# Patient Record
Sex: FEMALE | Race: OTHER | HISPANIC OR LATINO | URBAN - METROPOLITAN AREA
[De-identification: names, ages, dates, MRNs, and addresses within clinical notes are randomized per-mention and may not be internally consistent; named-entity substitution may affect disease eponyms.]

---
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
## 2023-01-26 ENCOUNTER — EMERGENCY (EMERGENCY)
Facility: HOSPITAL | Age: 23
LOS: 1 days | Discharge: HOME | End: 2023-01-26
Attending: EMERGENCY MEDICINE | Admitting: EMERGENCY MEDICINE
Payer: COMMERCIAL

## 2023-01-26 VITALS
DIASTOLIC BLOOD PRESSURE: 60 MMHG | HEART RATE: 104 BPM | SYSTOLIC BLOOD PRESSURE: 126 MMHG | TEMPERATURE: 99 F | RESPIRATION RATE: 16 BRPM | OXYGEN SATURATION: 100 %

## 2023-01-26 VITALS
OXYGEN SATURATION: 100 % | DIASTOLIC BLOOD PRESSURE: 68 MMHG | TEMPERATURE: 99 F | RESPIRATION RATE: 18 BRPM | SYSTOLIC BLOOD PRESSURE: 128 MMHG

## 2023-01-26 PROBLEM — D64.9 ANEMIA, UNSPECIFIED: Chronic | Status: ACTIVE | Noted: 2017-12-11

## 2023-01-26 LAB
ALBUMIN SERPL ELPH-MCNC: 4.6 G/DL — SIGNIFICANT CHANGE UP (ref 3.3–5)
ALP SERPL-CCNC: 55 U/L — SIGNIFICANT CHANGE UP (ref 40–120)
ALT FLD-CCNC: 10 U/L — SIGNIFICANT CHANGE UP (ref 4–33)
ANION GAP SERPL CALC-SCNC: 12 MMOL/L — SIGNIFICANT CHANGE UP (ref 7–14)
APPEARANCE UR: ABNORMAL
AST SERPL-CCNC: 26 U/L — SIGNIFICANT CHANGE UP (ref 4–32)
BACTERIA # UR AUTO: ABNORMAL
BASOPHILS # BLD AUTO: 0.04 K/UL — SIGNIFICANT CHANGE UP (ref 0–0.2)
BASOPHILS NFR BLD AUTO: 0.4 % — SIGNIFICANT CHANGE UP (ref 0–2)
BILIRUB SERPL-MCNC: 0.3 MG/DL — SIGNIFICANT CHANGE UP (ref 0.2–1.2)
BILIRUB UR-MCNC: NEGATIVE — SIGNIFICANT CHANGE UP
BUN SERPL-MCNC: 4 MG/DL — LOW (ref 7–23)
CALCIUM SERPL-MCNC: 9.4 MG/DL — SIGNIFICANT CHANGE UP (ref 8.4–10.5)
CHLORIDE SERPL-SCNC: 101 MMOL/L — SIGNIFICANT CHANGE UP (ref 98–107)
CO2 SERPL-SCNC: 24 MMOL/L — SIGNIFICANT CHANGE UP (ref 22–31)
COLOR SPEC: SIGNIFICANT CHANGE UP
CREAT SERPL-MCNC: 0.64 MG/DL — SIGNIFICANT CHANGE UP (ref 0.5–1.3)
DIFF PNL FLD: NEGATIVE — SIGNIFICANT CHANGE UP
EGFR: 127 ML/MIN/1.73M2 — SIGNIFICANT CHANGE UP
EOSINOPHIL # BLD AUTO: 0.02 K/UL — SIGNIFICANT CHANGE UP (ref 0–0.5)
EOSINOPHIL NFR BLD AUTO: 0.2 % — SIGNIFICANT CHANGE UP (ref 0–6)
EPI CELLS # UR: >27 /HPF — HIGH (ref 0–5)
GLUCOSE SERPL-MCNC: 91 MG/DL — SIGNIFICANT CHANGE UP (ref 70–99)
GLUCOSE UR QL: NEGATIVE — SIGNIFICANT CHANGE UP
HCG SERPL-ACNC: <5 MIU/ML — SIGNIFICANT CHANGE UP
HCT VFR BLD CALC: 39.3 % — SIGNIFICANT CHANGE UP (ref 34.5–45)
HGB BLD-MCNC: 11.9 G/DL — SIGNIFICANT CHANGE UP (ref 11.5–15.5)
HYALINE CASTS # UR AUTO: 2 /LPF — SIGNIFICANT CHANGE UP (ref 0–7)
IANC: 8.77 K/UL — HIGH (ref 1.8–7.4)
IMM GRANULOCYTES NFR BLD AUTO: 0.3 % — SIGNIFICANT CHANGE UP (ref 0–0.9)
KETONES UR-MCNC: ABNORMAL
LEUKOCYTE ESTERASE UR-ACNC: NEGATIVE — SIGNIFICANT CHANGE UP
LIDOCAIN IGE QN: 25 U/L — SIGNIFICANT CHANGE UP (ref 7–60)
LYMPHOCYTES # BLD AUTO: 1.64 K/UL — SIGNIFICANT CHANGE UP (ref 1–3.3)
LYMPHOCYTES # BLD AUTO: 14.8 % — SIGNIFICANT CHANGE UP (ref 13–44)
MCHC RBC-ENTMCNC: 23.5 PG — LOW (ref 27–34)
MCHC RBC-ENTMCNC: 30.3 GM/DL — LOW (ref 32–36)
MCV RBC AUTO: 77.5 FL — LOW (ref 80–100)
MONOCYTES # BLD AUTO: 0.59 K/UL — SIGNIFICANT CHANGE UP (ref 0–0.9)
MONOCYTES NFR BLD AUTO: 5.3 % — SIGNIFICANT CHANGE UP (ref 2–14)
NEUTROPHILS # BLD AUTO: 8.77 K/UL — HIGH (ref 1.8–7.4)
NEUTROPHILS NFR BLD AUTO: 79 % — HIGH (ref 43–77)
NITRITE UR-MCNC: NEGATIVE — SIGNIFICANT CHANGE UP
NRBC # BLD: 0 /100 WBCS — SIGNIFICANT CHANGE UP (ref 0–0)
NRBC # FLD: 0 K/UL — SIGNIFICANT CHANGE UP (ref 0–0)
PH UR: 8 — SIGNIFICANT CHANGE UP (ref 5–8)
PLATELET # BLD AUTO: 423 K/UL — HIGH (ref 150–400)
POTASSIUM SERPL-MCNC: 4.2 MMOL/L — SIGNIFICANT CHANGE UP (ref 3.5–5.3)
POTASSIUM SERPL-SCNC: 4.2 MMOL/L — SIGNIFICANT CHANGE UP (ref 3.5–5.3)
PROT SERPL-MCNC: 8.1 G/DL — SIGNIFICANT CHANGE UP (ref 6–8.3)
PROT UR-MCNC: ABNORMAL
RBC # BLD: 5.07 M/UL — SIGNIFICANT CHANGE UP (ref 3.8–5.2)
RBC # FLD: 15.9 % — HIGH (ref 10.3–14.5)
RBC CASTS # UR COMP ASSIST: 5 /HPF — HIGH (ref 0–4)
SODIUM SERPL-SCNC: 137 MMOL/L — SIGNIFICANT CHANGE UP (ref 135–145)
SP GR SPEC: 1.01 — SIGNIFICANT CHANGE UP (ref 1.01–1.05)
UROBILINOGEN FLD QL: SIGNIFICANT CHANGE UP
WBC # BLD: 11.09 K/UL — HIGH (ref 3.8–10.5)
WBC # FLD AUTO: 11.09 K/UL — HIGH (ref 3.8–10.5)
WBC UR QL: 5 /HPF — SIGNIFICANT CHANGE UP (ref 0–5)

## 2023-01-26 PROCEDURE — 99285 EMERGENCY DEPT VISIT HI MDM: CPT

## 2023-01-26 RX ORDER — FAMOTIDINE 10 MG/ML
1 INJECTION INTRAVENOUS
Qty: 14 | Refills: 0
Start: 2023-01-26 | End: 2023-02-01

## 2023-01-26 RX ORDER — FAMOTIDINE 10 MG/ML
20 INJECTION INTRAVENOUS ONCE
Refills: 0 | Status: COMPLETED | OUTPATIENT
Start: 2023-01-26 | End: 2023-01-26

## 2023-01-26 RX ORDER — SODIUM CHLORIDE 9 MG/ML
1000 INJECTION INTRAMUSCULAR; INTRAVENOUS; SUBCUTANEOUS ONCE
Refills: 0 | Status: COMPLETED | OUTPATIENT
Start: 2023-01-26 | End: 2023-01-26

## 2023-01-26 RX ORDER — ONDANSETRON 8 MG/1
1 TABLET, FILM COATED ORAL
Qty: 8 | Refills: 0
Start: 2023-01-26 | End: 2023-01-27

## 2023-01-26 RX ORDER — ONDANSETRON 8 MG/1
4 TABLET, FILM COATED ORAL ONCE
Refills: 0 | Status: COMPLETED | OUTPATIENT
Start: 2023-01-26 | End: 2023-01-26

## 2023-01-26 RX ORDER — ONDANSETRON 8 MG/1
1 TABLET, FILM COATED ORAL
Qty: 9 | Refills: 0
Start: 2023-01-26 | End: 2023-01-28

## 2023-01-26 RX ADMIN — SODIUM CHLORIDE 1000 MILLILITER(S): 9 INJECTION INTRAMUSCULAR; INTRAVENOUS; SUBCUTANEOUS at 11:11

## 2023-01-26 RX ADMIN — FAMOTIDINE 20 MILLIGRAM(S): 10 INJECTION INTRAVENOUS at 07:56

## 2023-01-26 RX ADMIN — Medication 30 MILLILITER(S): at 07:55

## 2023-01-26 RX ADMIN — ONDANSETRON 4 MILLIGRAM(S): 8 TABLET, FILM COATED ORAL at 07:51

## 2023-01-26 RX ADMIN — SODIUM CHLORIDE 1000 MILLILITER(S): 9 INJECTION INTRAMUSCULAR; INTRAVENOUS; SUBCUTANEOUS at 07:51

## 2023-01-26 NOTE — ED ADULT NURSE NOTE - OBJECTIVE STATEMENT
Pt. presented to intake 9. A&Ox4 ambulatory. Pt. c/o nausea gas unable to eat since last sun. Pt. went out drinking last sun and thought she was hungover but symptoms have persisted. denies CP SOB dysuria diarrhea. LAC20G labs sent medicated per order.

## 2023-01-26 NOTE — ED PROVIDER NOTE - PROGRESS NOTE DETAILS
Rachel Rodriguez, PGY-1: pt denies nausea. Reports feeling hungry. Was given food for PO challenge -- tolerated food/drink.

## 2023-01-26 NOTE — ED PROVIDER NOTE - NSFOLLOWUPCLINICS_GEN_ALL_ED_FT
Gastroenterology at [facility]  Gastroenterology  [address] [place] [id number]  Phone: [phone]  Fax:

## 2023-01-26 NOTE — ED PROVIDER NOTE - DIFFERENTIAL DIAGNOSIS
GERD/gastritis, IBS, alcohol-related, anxiety component (explained to pt and mother that although anxiety may not be the primary cause of symptoms, anxiety will worsen symptoms) Differential Diagnosis

## 2023-01-26 NOTE — ED PROVIDER NOTE - CLINICAL SUMMARY MEDICAL DECISION MAKING FREE TEXT BOX
22-year-old female with past medical history of anemia and anxiety, presenting with decreased appetite for 5 days.  Patient reports that she had an episode of emesis on Sunday, after having gone out drinking on Saturday night.  Patient had approximately 5 alcoholic beverages.  On Sunday, patient felt bloated and nauseous, had 1 NBNB episode of emesis.  Since then, she has felt nauseous and full.  Also having decreased appetite, patient has been able to tolerate fluids at home. Reports increased urinary frequency though acknowledges she has also been drinking more fluids than usual. Has continued to have bowel movements.  Denies diarrhea, constipation, blood in the stool.  She also reports feeling like she has been burping more.  She had a few hours where she felt gassy, but did not pass gas, but this self resolved after taking Mylanta last night.  Today she had another bowel movement.  Patient reports that she does not drink alcohol regularly.  Denies drug use/smoking.  Is currently sexually active with her boyfriend, does not use protection.  She denies any fever/chills, chest pain/shortness of breath, vaginal bleeding/vaginal discharge, dysuria, hematuria, abdominal pain, or increased thirst.    VS show tachycardia, otherwise WNL. PE benign. Moist mucus membranes. No abdominal pain. Normal heart/lung sounds. No chest wall tenderness or crepitus. no cervical lymphadenopathy. High suspicion gastritis vs dyspepsia. Assess for electrolyte imbalances, pregnancy, UTI, anemia. Plan: cbc, cmp, lipase, hcg, ua/uc, IVF NS bolus, treat nausea/abd pain with zofran, maalox, pepcid. 22-year-old female with past medical history of anemia and anxiety, no PSH, presenting with decreased appetite for 5 days.  Patient reports that she had an episode of NBNB emesis on Sunday, after having gone out drinking on Saturday night.  Patient had approximately 5 alcoholic beverages. Since then, she has felt nauseous and full.  Although having decreased appetite, patient has been able to tolerate fluids at home. Reports increased urinary frequency though acknowledges she has also been drinking more fluids than usual. Has continued to have bowel movements.  She also reports feeling like she has been burping more.  She had a few hours where she felt gassy, but did not pass gas, but this self resolved after taking Mylanta last night.  Today, she had another bowel movement.  Patient reports that she does not drink alcohol regularly.  Denies drug use/smoking.  Is currently sexually active with her boyfriend, does not use protection.  She denies any fever/chills, chest pain/shortness of breath, vaginal bleeding/vaginal discharge, dysuria, hematuria, abdominal pain, diarrhea, constipation, blood in the stool, or increased thirst.    VS show tachycardia, otherwise WNL. PE benign. Moist mucus membranes. No abdominal pain. Normal heart/lung sounds. No chest wall tenderness or crepitus. no cervical lymphadenopathy. High suspicion gastritis vs dyspepsia. Assess for electrolyte imbalances, pregnancy, UTI, anemia. Plan: cbc, cmp, lipase, hcg, ua/uc, IVF NS bolus, treat nausea/abd pain with zofran, maalox, pepcid.

## 2023-01-26 NOTE — ED PROVIDER NOTE - ATTENDING CONTRIBUTION TO CARE
Dr. Barajas: This is a 22-year-old female with past medical history of anemia and reporting anxiety, in the emergency department with 5 days of decreased appetite and 1 episode of nonbloody vomiting 4 days ago.  Patient states that the day prior to this beginning she went out drinking alcohol and on the day that symptoms began she awoke feeling like she may have "a hangover".  She notes that since then she has been feeling decreased appetite but continues to be able to tolerate fluids and small amounts of food without vomiting.  She has normal bowel movements without blood, and she is still passing gas, although she notes that she feels like she is burping more than usual.  Patient and mother at bedside feel that sometimes patient has these types of symptoms in the context of feeling anxious and eating less because of it, but patient was concerned and wanted to come to the emergency department for evaluation.  On exam pt well appearing, in NAD, with moist mucous membranes, heart RRR, lungs CTAB, abd NTND, extremities without swelling, strength 5/5 in all extremities and skin without rash.  Pt expressed mild lower abdominal pain to resident with palpation, but none on my exam.

## 2023-01-26 NOTE — ED PROVIDER NOTE - PHYSICAL EXAMINATION
VITALS:   T(C): 37.1 (01-26-23 @ 06:04), Max: 37.1 (01-26-23 @ 06:04)  HR: 104 (01-26-23 @ 06:04) (104 - 104)  BP: 126/60 (01-26-23 @ 06:04) (126/60 - 126/60)  RR: 16 (01-26-23 @ 06:04) (16 - 16)  SpO2: 100% (01-26-23 @ 06:04) (100% - 100%)    GENERAL: NAD, lying in bed comfortably  HEAD:  Atraumatic, Normocephalic  EYES: EOMI, conjunctiva and sclera clear  ENT: Moist mucous membranes  NECK: Supple  CHEST/LUNG: Clear to auscultation bilaterally; No rales, rhonchi, wheezing, or rubs. Unlabored respirations  HEART: Regular rate and rhythm; No murmurs, rubs, or gallops  ABDOMEN: Soft, nontender, nondistended  EXTREMITIES:  No LE edema  NERVOUS SYSTEM:  A&Ox3, no focal deficits   SKIN: No rashes or lesions

## 2023-01-26 NOTE — ED ADULT TRIAGE NOTE - CHIEF COMPLAINT QUOTE
Pt. c/o nausea and vomiting since Sunday. Endorses bloating and not being able to pass gas since. states it feels like she had a full meal without eating.

## 2023-01-26 NOTE — ED PROVIDER NOTE - TEST CONSIDERED BUT NOT PERFORMED
Tests Considered But Not Performed CTAP considered, but without focal TTP, and with tolerating PO, no CT performed, as presentation not consistent with acute, life-threatening abdominal process

## 2023-01-26 NOTE — ED PROVIDER NOTE - NSFOLLOWUPINSTRUCTIONS_ED_ALL_ED_FT
You were seen in the emergency department for abdominal pain. You most likely have gastritis -- inflammation of the stomach lining.  Please read all attached patient information, read all additional instructions below, and follow-up with all providers as directed.    1) Follow-up with your primary care provider in 1-2 days. Follow up with a gastroenterologist if your symptoms do not resolve within a week.    2) Continue to take all medications as prescribed.    3) Rest and stay hydrated. Pain can be managed with Acetaminophen (aka Tylenol) and Ibuprofen (aka Motrin or Advil) over the counter as directed.    4) Return to the ER for any new or worsening symptoms.      Please read all attached patient information.       Gastritis, Adult  Gastritis is swelling (inflammation) of the stomach. Gastritis can develop quickly (acute). It can also develop slowly over time (chronic). It is important to get help for this condition. If you do not get help, your stomach can bleed, and you can get sores (ulcers) in your stomach.      What are the causes?  This condition may be caused by:  •Germs that get to your stomach.  •Drinking too much alcohol.  •Medicines you are taking.  •Too much acid in the stomach.  •A disease of the intestines or stomach.  •Stress.  •An allergic reaction.  •Crohn's disease.  •Some cancer treatments (radiation).  Sometimes the cause of this condition is not known.      What are the signs or symptoms?  Symptoms of this condition include:  •Pain in your stomach.  •A burning feeling in your stomach.  •Feeling sick to your stomach (nauseous).  •Throwing up (vomiting).  •Feeling too full after you eat.  •Weight loss.  •Bad breath.  •Throwing up blood.  •Blood in your poop (stool).        How is this diagnosed?  This condition may be diagnosed with:  •Your medical history and symptoms.  •A physical exam.  •Tests. These can include:  •Blood tests.      •Stool tests.  •A procedure to look inside your stomach (upper endoscopy).  •A test in which a sample of tissue is taken for testing (biopsy).      How is this treated?  Treatment for this condition depends on what caused it. You may be given:  •Antibiotic medicine, if your condition was caused by germs.  •H2 blockers and similar medicines, if your condition was caused by too much acid.        Follow these instructions at home:    Medicines   •Take over-the-counter and prescription medicines only as told by your doctor.  •If you were prescribed an antibiotic medicine, take it as told by your doctor. Do not stop taking it even if you start to feel better.        Eating and drinking   •Eat small meals often, instead of large meals.  •Avoid foods and drinks that make your symptoms worse.  •Drink enough fluid to keep your pee (urine) pale yellow.    Alcohol use   • Do not drink alcohol if:  •Your doctor tells you not to drink.  •You are pregnant, may be pregnant, or are planning to become pregnant.  •If you drink alcohol:•Limit your use to:  •0–1 drink a day for women.  •0–2 drinks a day for men.  •Be aware of how much alcohol is in your drink. In the [place] one drink equals one 12 oz bottle of beer (355 mL), one 5 oz glass of wine (148 mL), or one 1½ oz glass of hard liquor (44 mL).        General instructions   •Talk with your doctor about ways to manage stress. You can exercise or do deep breathing, meditation, or yoga.  • Do not smoke or use products that have nicotine or tobacco. If you need help quitting, ask your doctor.  •Keep all follow-up visits as told by your doctor. This is important.        Contact a doctor if:  •Your symptoms get worse.  •Your symptoms go away and then come back.    Get help right away if:  •You throw up blood or something that looks like coffee grounds.  •You have black or dark red poop.  •You throw up any time you try to drink fluids.  •Your stomach pain gets worse.  •You have a fever.  •You do not feel better after one week.        Summary  •Gastritis is swelling (inflammation) of the stomach.  •You must get help for this condition. If you do not get help, your stomach can bleed, and you can get sores (ulcers).  •This condition is diagnosed with medical history, physical exam, or tests  •You can be treated with medicines for germs or medicines to block too much acid in your stomach.  This information is not intended to replace advice given to you by your health care provider. Make sure you discuss any questions you have with your health care provider.

## 2023-01-26 NOTE — ED PROVIDER NOTE - CONSIDERATION OF ADMISSION OBSERVATION
pt tolerating PO and without focal TTP on exam, does not require admission Consideration of Admission/Observation

## 2023-01-26 NOTE — ED PROVIDER NOTE - PATIENT PORTAL LINK FT
You can access the FollowMyHealth Patient Portal offered by [facility] by registering at the following website: [email or web address] By joining [facility]’s FollowMyHealth portal, you will also be able to view your health information using other applications (apps) compatible with our system.

## 2023-01-26 NOTE — ED PROVIDER NOTE - OBJECTIVE STATEMENT
22-year-old female with past medical history of anemia and anxiety, presenting with decreased appetite for 5 days.  Patient reports that she had an episode of emesis on Sunday, after having gone out drinking on Saturday night.  Patient had approximately 5 alcoholic beverages.  On Sunday, patient felt bloated and nauseous, had 1 NBNB episode of emesis.  Since then, she has felt nauseous and full.  Also having decreased appetite, patient has been able to tolerate fluids at home. Reports increased urinary frequency though acknowledges she has also been drinking more fluids than usual. Has continued to have bowel movements.  Denies diarrhea, constipation, blood in the stool.  She also reports feeling like she has been burping more.  She had a few hours where she felt gassy, but did not pass gas, but this self resolved after taking Mylanta last night.  Today she had another bowel movement.  Patient reports that she does not drink alcohol regularly.  Denies drug use/smoking.  Is currently sexually active with her boyfriend, does not use protection.  She denies any fever/chills, chest pain/shortness of breath, vaginal bleeding/vaginal discharge, dysuria, hematuria, abdominal pain, or increased thirst. 22-year-old female with past medical history of anemia and anxiety, no PSH, presenting with decreased appetite for 5 days.  Patient reports that she had an episode of NBNB emesis on Sunday, after having gone out drinking on Saturday night.  Patient had approximately 5 alcoholic beverages. Since then, she has felt nauseous and full.  Although having decreased appetite, patient has been able to tolerate fluids at home. Reports increased urinary frequency though acknowledges she has also been drinking more fluids than usual. Has continued to have bowel movements.  She also reports feeling like she has been burping more.  She had a few hours where she felt gassy, but did not pass gas, but this self resolved after taking Mylanta last night.  Today, she had another bowel movement.  Patient reports that she does not drink alcohol regularly.  Denies drug use/smoking.  Is currently sexually active with her boyfriend, does not use protection.  She denies any fever/chills, chest pain/shortness of breath, vaginal bleeding/vaginal discharge, dysuria, hematuria, abdominal pain, diarrhea, constipation, blood in the stool, or increased thirst.

## 2023-01-28 LAB
CULTURE RESULTS: SIGNIFICANT CHANGE UP
SPECIMEN SOURCE: SIGNIFICANT CHANGE UP

## 2023-07-26 ENCOUNTER — EMERGENCY (EMERGENCY)
Facility: HOSPITAL | Age: 23
LOS: 1 days | Discharge: HOME | End: 2023-07-26
Attending: STUDENT IN AN ORGANIZED HEALTH CARE EDUCATION/TRAINING PROGRAM | Admitting: STUDENT IN AN ORGANIZED HEALTH CARE EDUCATION/TRAINING PROGRAM
Payer: COMMERCIAL

## 2023-07-26 VITALS
DIASTOLIC BLOOD PRESSURE: 81 MMHG | OXYGEN SATURATION: 100 % | RESPIRATION RATE: 18 BRPM | HEART RATE: 110 BPM | SYSTOLIC BLOOD PRESSURE: 125 MMHG | TEMPERATURE: 99 F

## 2023-07-26 VITALS
DIASTOLIC BLOOD PRESSURE: 53 MMHG | RESPIRATION RATE: 17 BRPM | SYSTOLIC BLOOD PRESSURE: 121 MMHG | TEMPERATURE: 98 F | OXYGEN SATURATION: 97 % | HEART RATE: 63 BPM

## 2023-07-26 PROBLEM — Z00.00 ENCOUNTER FOR PREVENTIVE HEALTH EXAMINATION: Status: ACTIVE | Noted: 2023-07-26

## 2023-07-26 LAB
ALBUMIN SERPL ELPH-MCNC: 4.1 G/DL — SIGNIFICANT CHANGE UP (ref 3.3–5)
ALP SERPL-CCNC: 47 U/L — SIGNIFICANT CHANGE UP (ref 40–120)
ALT FLD-CCNC: 13 U/L — SIGNIFICANT CHANGE UP (ref 4–33)
ANION GAP SERPL CALC-SCNC: 11 MMOL/L — SIGNIFICANT CHANGE UP (ref 7–14)
APPEARANCE UR: ABNORMAL
AST SERPL-CCNC: 21 U/L — SIGNIFICANT CHANGE UP (ref 4–32)
BACTERIA # UR AUTO: ABNORMAL /HPF
BASOPHILS # BLD AUTO: 0.03 K/UL — SIGNIFICANT CHANGE UP (ref 0–0.2)
BASOPHILS NFR BLD AUTO: 0.2 % — SIGNIFICANT CHANGE UP (ref 0–2)
BILIRUB SERPL-MCNC: 0.2 MG/DL — SIGNIFICANT CHANGE UP (ref 0.2–1.2)
BILIRUB UR-MCNC: NEGATIVE — SIGNIFICANT CHANGE UP
BUN SERPL-MCNC: 4 MG/DL — LOW (ref 7–23)
CALCIUM SERPL-MCNC: 8.9 MG/DL — SIGNIFICANT CHANGE UP (ref 8.4–10.5)
CAST: 2 /LPF — SIGNIFICANT CHANGE UP (ref 0–4)
CHLORIDE SERPL-SCNC: 105 MMOL/L — SIGNIFICANT CHANGE UP (ref 98–107)
CO2 SERPL-SCNC: 19 MMOL/L — LOW (ref 22–31)
COLOR SPEC: YELLOW — SIGNIFICANT CHANGE UP
CREAT SERPL-MCNC: 0.57 MG/DL — SIGNIFICANT CHANGE UP (ref 0.5–1.3)
DIFF PNL FLD: NEGATIVE — SIGNIFICANT CHANGE UP
EGFR: 131 ML/MIN/1.73M2 — SIGNIFICANT CHANGE UP
EOSINOPHIL # BLD AUTO: 0.02 K/UL — SIGNIFICANT CHANGE UP (ref 0–0.5)
EOSINOPHIL NFR BLD AUTO: 0.1 % — SIGNIFICANT CHANGE UP (ref 0–6)
GLUCOSE SERPL-MCNC: 87 MG/DL — SIGNIFICANT CHANGE UP (ref 70–99)
GLUCOSE UR QL: NEGATIVE MG/DL — SIGNIFICANT CHANGE UP
HCG SERPL-ACNC: SIGNIFICANT CHANGE UP MIU/ML
HCT VFR BLD CALC: 34.1 % — LOW (ref 34.5–45)
HGB BLD-MCNC: 10.9 G/DL — LOW (ref 11.5–15.5)
IANC: 10.48 K/UL — HIGH (ref 1.8–7.4)
IMM GRANULOCYTES NFR BLD AUTO: 0.5 % — SIGNIFICANT CHANGE UP (ref 0–0.9)
KETONES UR-MCNC: >=160 MG/DL
LEUKOCYTE ESTERASE UR-ACNC: NEGATIVE — SIGNIFICANT CHANGE UP
LYMPHOCYTES # BLD AUTO: 16.2 % — SIGNIFICANT CHANGE UP (ref 13–44)
LYMPHOCYTES # BLD AUTO: 2.22 K/UL — SIGNIFICANT CHANGE UP (ref 1–3.3)
MCHC RBC-ENTMCNC: 23.9 PG — LOW (ref 27–34)
MCHC RBC-ENTMCNC: 32 GM/DL — SIGNIFICANT CHANGE UP (ref 32–36)
MCV RBC AUTO: 74.6 FL — LOW (ref 80–100)
MONOCYTES # BLD AUTO: 0.88 K/UL — SIGNIFICANT CHANGE UP (ref 0–0.9)
MONOCYTES NFR BLD AUTO: 6.4 % — SIGNIFICANT CHANGE UP (ref 2–14)
NEUTROPHILS # BLD AUTO: 10.48 K/UL — HIGH (ref 1.8–7.4)
NEUTROPHILS NFR BLD AUTO: 76.6 % — SIGNIFICANT CHANGE UP (ref 43–77)
NITRITE UR-MCNC: NEGATIVE — SIGNIFICANT CHANGE UP
NRBC # BLD: 0 /100 WBCS — SIGNIFICANT CHANGE UP (ref 0–0)
NRBC # FLD: 0 K/UL — SIGNIFICANT CHANGE UP (ref 0–0)
PH UR: 6.5 — SIGNIFICANT CHANGE UP (ref 5–8)
PLATELET # BLD AUTO: 384 K/UL — SIGNIFICANT CHANGE UP (ref 150–400)
POTASSIUM SERPL-MCNC: 4.6 MMOL/L — SIGNIFICANT CHANGE UP (ref 3.5–5.3)
POTASSIUM SERPL-SCNC: 4.6 MMOL/L — SIGNIFICANT CHANGE UP (ref 3.5–5.3)
PROT SERPL-MCNC: 7.3 G/DL — SIGNIFICANT CHANGE UP (ref 6–8.3)
PROT UR-MCNC: 30 MG/DL
RBC # BLD: 4.57 M/UL — SIGNIFICANT CHANGE UP (ref 3.8–5.2)
RBC # FLD: 15.6 % — HIGH (ref 10.3–14.5)
RBC CASTS # UR COMP ASSIST: 2 /HPF — SIGNIFICANT CHANGE UP (ref 0–4)
REVIEW: SIGNIFICANT CHANGE UP
SODIUM SERPL-SCNC: 135 MMOL/L — SIGNIFICANT CHANGE UP (ref 135–145)
SP GR SPEC: 1.02 — SIGNIFICANT CHANGE UP (ref 1–1.03)
SQUAMOUS # UR AUTO: 4 /HPF — SIGNIFICANT CHANGE UP (ref 0–5)
UROBILINOGEN FLD QL: 1 MG/DL — SIGNIFICANT CHANGE UP (ref 0.2–1)
WBC # BLD: 13.7 K/UL — HIGH (ref 3.8–10.5)
WBC # FLD AUTO: 13.7 K/UL — HIGH (ref 3.8–10.5)
WBC UR QL: 20 /HPF — HIGH (ref 0–5)

## 2023-07-26 PROCEDURE — 99284 EMERGENCY DEPT VISIT MOD MDM: CPT

## 2023-07-26 PROCEDURE — 76830 TRANSVAGINAL US NON-OB: CPT | Mod: 26

## 2023-07-26 PROCEDURE — 93010 ELECTROCARDIOGRAM REPORT: CPT

## 2023-07-26 RX ORDER — ONDANSETRON 8 MG/1
1 TABLET, FILM COATED ORAL
Qty: 10 | Refills: 0
Start: 2023-07-26 | End: 2023-07-28

## 2023-07-26 RX ORDER — ONDANSETRON 8 MG/1
4 TABLET, FILM COATED ORAL ONCE
Refills: 0 | Status: COMPLETED | OUTPATIENT
Start: 2023-07-26 | End: 2023-07-26

## 2023-07-26 RX ORDER — SODIUM CHLORIDE 9 MG/ML
1000 INJECTION INTRAMUSCULAR; INTRAVENOUS; SUBCUTANEOUS ONCE
Refills: 0 | Status: COMPLETED | OUTPATIENT
Start: 2023-07-26 | End: 2023-07-26

## 2023-07-26 RX ADMIN — SODIUM CHLORIDE 1000 MILLILITER(S): 9 INJECTION INTRAMUSCULAR; INTRAVENOUS; SUBCUTANEOUS at 08:54

## 2023-07-26 RX ADMIN — ONDANSETRON 4 MILLIGRAM(S): 8 TABLET, FILM COATED ORAL at 08:54

## 2023-07-26 NOTE — ED PROVIDER NOTE - NSFOLLOWUPINSTRUCTIONS_ED_ALL_ED_FT
Follow up with obstetrician as scheduled.     If you start developing urinary complains, fever, abdominal pain, vaginal bleeding/discharge or any other concerning symptoms please seek medical assistance.     Hyperemesis Gravidarum  Hyperemesis gravidarum is a severe form of nausea and vomiting that happens during pregnancy. Hyperemesis is worse than morning sickness. It may cause you to have nausea or vomiting all day for many days. It may keep you from eating and drinking enough food and liquids, which can lead to dehydration, malnutrition, and weight loss. Hyperemesis usually occurs during the first half (the first 20 weeks) of pregnancy. It often goes away once a woman is in her second half of pregnancy. However, sometimes hyperemesis continues through an entire pregnancy.    What are the causes?  The cause of this condition is not known. It may be associated with:  Changes in hormones in the body during pregnancy.  Changes in the gastrointestinal system.  Genetic or inherited conditions.  What are the signs or symptoms?  Symptoms of this condition include:  Severe nausea and vomiting that does not go away.  Problems keeping food down.  Weight loss.  Loss of body fluid (dehydration).  Loss of appetite. You may have no desire to eat or you may not like the food you have previously enjoyed.  How is this diagnosed?  This condition may be diagnosed based on your medical history, your symptoms, and a physical exam.    You may also have other tests, including:  Blood tests.  Urine tests.  Blood pressure tests.  Ultrasound to look for problems with the placenta or to check if you are pregnant with more than one baby.  How is this treated?  This condition is managed by controlling symptoms. This may include:  Following an eating plan. This can help to lessen nausea and vomiting.  Treatments that do not use medicine. These include acupressure bracelets, hypnosis, and eating or drinking foods or fluids that contain ginger, ginger ale, or ginger tea.  Taking prescription medicine or over-the-counter medicine as told by your health care provider.  Continuing to take prenatal vitamins. You may need to change what kind you take and when you take them. Follow your health care provider's instructions about prenatal vitamins.  An eating plan and medicines are often used together to help control symptoms. If medicines do not help relieve nausea and vomiting, you may need to receive fluids through an IV at the hospital.    Follow these instructions at home:  To help relieve your symptoms, listen to your body. Everyone is different and has different preferences. Find what works best for you. Here are some things you can try to help relieve your symptoms:    Meals and snacks      Eat 5–6 small meals daily instead of 3 large meals. Eating small meals and snacks can help you avoid an empty stomach.  Before getting out of bed, eat a couple of crackers to avoid moving around on an empty stomach.  Eat a protein-rich snack before bed. Examples include cheese and crackers, or a peanut butter sandwich made with 1 slice of whole-wheat bread and 1 tsp (5 g) of peanut butter.  Eat and drink slowly.  Try eating starchy foods as these are usually tolerated well. Examples include cereal, toast, bread, potatoes, pasta, rice, and pretzels.  Eat at least one serving of protein with your meals and snacks. Protein options include lean meats, poultry, seafood, beans, nuts, nut butters, eggs, cheese, and yogurt.  Eat or suck on things that have ginger in them. It may help to relieve nausea. Add ¼ tsp (0.44 g) ground ginger to hot tea, or choose ginger tea.  Fluids    It is important to stay hydrated. Try to:  Drink small amounts of fluids often.  Drink fluids 30 minutes before or after a meal to help lessen the feeling of a full stomach.  Drink 100% fruit juice or an electrolyte drink. An electrolyte drink contains sodium, potassium, and chloride.  Drink fluids that are cold, clear, and carbonated or sour. These include lemonade, ginger ale, lemon–lime soda, ice water, and sparkling water.  Things to avoid    Avoid the following:  Eating foods that trigger your symptoms. These may include spicy foods, coffee, high-fat foods, very sweet foods, and acidic foods.  Drinking more than 1 cup of fluid at a time.  Skipping meals. Nausea can be more intense on an empty stomach. If you cannot tolerate food, do not force it. Try sucking on ice chips or other frozen items and make up for missed calories later.  Lying down within 2 hours after eating.  Being exposed to environmental triggers. These may include food smells, smoky rooms, closed spaces, rooms with strong smells, warm or humid places, overly loud and noisy rooms, and rooms with motion or flickering lights. Try eating meals in a well-ventilated area that is free of strong smells.  Making quick and sudden changes in your movement.  Taking iron pills and multivitamins that contain iron. If you take prescription iron pills, do not stop taking them unless your health care provider approves.  Preparing food. The smell of food can spoil your appetite or trigger nausea.  General instructions    Brush your teeth or use a mouth rinse after meals.  Take over-the-counter and prescription medicines only as told by your health care provider.  Follow instructions from your health care provider about eating or drinking restrictions.  Talk with your health care provider about starting a supplement of vitamin B6.  Continue to take your prenatal vitamins as told by your health care provider. If you are having trouble taking your prenatal vitamins, talk with your health care provider about other options.  Keep all follow-up visits. This is important. Follow-up visits include prenatal visits.  Contact a health care provider if:  You have pain in your abdomen.  You have a severe headache.  You have vision problems.  You are losing weight.  You feel weak or dizzy.  You cannot eat or drink without vomiting, especially if this goes on for a full day.  Get help right away if:  You cannot drink fluids without vomiting.  You vomit blood.  You have constant nausea and vomiting.  You are very weak.  You faint.  You have a fever and your symptoms suddenly get worse.  Summary  Hyperemesis gravidarum is a severe form of nausea and vomiting that happens during pregnancy.  Making some changes to your eating habits may help relieve nausea and vomiting.  This condition may be managed with lifestyle changes and medicines as prescribed by your health care provider.  If medicines do not help relieve nausea and vomiting, you may need to receive fluids through an IV at the hospital.

## 2023-07-26 NOTE — ED ADULT TRIAGE NOTE - CHIEF COMPLAINT QUOTE
Pt. c/o nausea and vomiting x5 days. states LMP 6/9 and is approximately 6 weeks pregnant but has not had an OB appointment. Pt. c/o nausea and vomiting x5 days. Endorses palpitations. states LMP 6/9 and is approximately 6 weeks pregnant but has not had an OB appointment.

## 2023-07-26 NOTE — ED ADULT NURSE NOTE - CHIEF COMPLAINT QUOTE
Pt. c/o nausea and vomiting x5 days. Endorses palpitations. states LMP 6/9 and is approximately 6 weeks pregnant but has not had an OB appointment.

## 2023-07-26 NOTE — ED ADULT NURSE REASSESSMENT NOTE - NS ED NURSE REASSESS COMMENT FT1
Break covering RN: patient received to RW- no acute distress noted. patient offers no complaints at this time. vital signs as charted. patient c/o IV discomfort at this time- IV line clean dry and intact, no signs of redness or swelling. per MD Schneider- OK to remove IV line at this time. IV line discontinued.

## 2023-07-26 NOTE — ED ADULT NURSE NOTE - NSFALLUNIVINTERV_ED_ALL_ED
Bed/Stretcher in lowest position, wheels locked, appropriate side rails in place/Call bell, personal items and telephone in reach/Instruct patient to call for assistance before getting out of bed/chair/stretcher/Non-slip footwear applied when patient is off stretcher/[place] to call system/Physically safe environment - no spills, clutter or unnecessary equipment/Purposeful proactive rounding/Room/bathroom lighting operational, light cord in reach

## 2023-07-26 NOTE — ED ADULT NURSE NOTE - OBJECTIVE STATEMENT
Pt A+Ox4. Pt c/o nausea and vomiting since wednesday but has gotten worse since friday.  Pt states she thinks she is about 6 weeks pregnant.  Last menstrual cycle was 6/9.  Pt denies abdominal pain.  No vaginal bleeding or discharge.  IV placed left AC#20.  IVF infusing.  Pt awaiting ultrasound.

## 2023-07-26 NOTE — ED PROVIDER NOTE - PROGRESS NOTE DETAILS
Dona Schneider, DO:  Feels better. No new complaints. Results are discussed. An opportunity to ask questions was provided and all questions answered. Discharge plan is discussed with the patient. Patient reports understanding to do the follow up with pcp. Return precautions discussed.

## 2023-07-26 NOTE — ED PROVIDER NOTE - PATIENT PORTAL LINK FT
You can access the FollowMyHealth Patient Portal offered by [facility] by registering at the following website: [email or web address]. By joining [employer]’s FollowMyHealth portal, you will also be able to view your health information using other applications (apps) compatible with our system.

## 2023-07-26 NOTE — ED PROVIDER NOTE - CLINICAL SUMMARY MEDICAL DECISION MAKING FREE TEXT BOX
22-year-old female G1, P0 no past medical history coming in with nausea, vomiting.  No abdominal pain, vaginal bleeding no fevers, chills, diarrhea, urinary complaints.  Tested positive for pregnancy at home.  This is a desired pregnancy.  Has an appointment scheduled with OB in 2 weeks.  Patient is well-appearing.  No distress.  Abdomen soft nontender.  No CVA tenderness to palpation.  Eval for IUP, electrolyte abnormalities, UTI.  Patient is noted to be tachycardic likely due to dehydration.  Antiemetic.

## 2023-07-28 LAB
CULTURE RESULTS: SIGNIFICANT CHANGE UP
SPECIMEN SOURCE: SIGNIFICANT CHANGE UP

## 2023-08-09 ENCOUNTER — APPOINTMENT (OUTPATIENT)
Dept: OBGYN | Facility: CLINIC | Age: 23
End: 2023-08-09

## 2023-08-10 ENCOUNTER — APPOINTMENT (OUTPATIENT)
Dept: OBGYN | Facility: CLINIC | Age: 23
End: 2023-08-10

## 2023-10-01 NOTE — ED ADULT TRIAGE NOTE - GLASGOW COMA SCALE: BEST MOTOR RESPONSE, MLM
Plan of care discussed with medical attending this afternoon. Recommended to start NS at 50cc/hr. Repeat follow up labs obtained at 4pm, Na showing 133. Of note, patient's Na on admission at 2pm yesterday was 124. Nephrology made aware of results and possible overcorrection. Recommends fluid restriction. Discussed with RN to discontinue IV fluids immediately. Per RN, IV fluids were already discontinued as pt pulled her IV out and only received 2 hours of IV fluids. Nephrology to get back to ACP team regarding further plan. Will sign out to night ACP to follow up and continue monitoring patient at this time.
Nephrology consult called, will follow up further recommendations.
(M6) obeys commands

## 2024-04-08 ENCOUNTER — INPATIENT (INPATIENT)
Facility: HOSPITAL | Age: 24
LOS: 1 days | Discharge: HOME | End: 2024-04-10
Attending: INTERNAL MEDICINE | Admitting: INTERNAL MEDICINE
Payer: COMMERCIAL

## 2024-04-08 ENCOUNTER — TRANSCRIPTION ENCOUNTER (OUTPATIENT)
Age: 24
End: 2024-04-08

## 2024-04-08 VITALS
DIASTOLIC BLOOD PRESSURE: 56 MMHG | RESPIRATION RATE: 18 BRPM | OXYGEN SATURATION: 97 % | SYSTOLIC BLOOD PRESSURE: 115 MMHG | HEART RATE: 83 BPM | TEMPERATURE: 98 F

## 2024-04-08 DIAGNOSIS — Z98.891 HISTORY OF UTERINE SCAR FROM PREVIOUS SURGERY: Chronic | ICD-10-CM

## 2024-04-08 DIAGNOSIS — K85.90 ACUTE PANCREATITIS WITHOUT NECROSIS OR INFECTION, UNSPECIFIED: ICD-10-CM

## 2024-04-08 LAB
ADD ON TEST-SPECIMEN IN LAB: SIGNIFICANT CHANGE UP
ALBUMIN SERPL ELPH-MCNC: 4.2 G/DL — SIGNIFICANT CHANGE UP (ref 3.3–5)
ALP SERPL-CCNC: 214 U/L — HIGH (ref 40–120)
ALT FLD-CCNC: 674 U/L — HIGH (ref 4–33)
ANION GAP SERPL CALC-SCNC: 16 MMOL/L — HIGH (ref 7–14)
AST SERPL-CCNC: 660 U/L — HIGH (ref 4–32)
BASOPHILS # BLD AUTO: 0.03 K/UL — SIGNIFICANT CHANGE UP (ref 0–0.2)
BASOPHILS NFR BLD AUTO: 0.3 % — SIGNIFICANT CHANGE UP (ref 0–2)
BILIRUB SERPL-MCNC: 3.5 MG/DL — HIGH (ref 0.2–1.2)
BUN SERPL-MCNC: 7 MG/DL — SIGNIFICANT CHANGE UP (ref 7–23)
CALCIUM SERPL-MCNC: 9.6 MG/DL — SIGNIFICANT CHANGE UP (ref 8.4–10.5)
CHLORIDE SERPL-SCNC: 103 MMOL/L — SIGNIFICANT CHANGE UP (ref 98–107)
CO2 SERPL-SCNC: 20 MMOL/L — LOW (ref 22–31)
CREAT SERPL-MCNC: 0.75 MG/DL — SIGNIFICANT CHANGE UP (ref 0.5–1.3)
EGFR: 114 ML/MIN/1.73M2 — SIGNIFICANT CHANGE UP
EOSINOPHIL # BLD AUTO: 0.03 K/UL — SIGNIFICANT CHANGE UP (ref 0–0.5)
EOSINOPHIL NFR BLD AUTO: 0.3 % — SIGNIFICANT CHANGE UP (ref 0–6)
GLUCOSE SERPL-MCNC: 103 MG/DL — HIGH (ref 70–99)
HCT VFR BLD CALC: 38.6 % — SIGNIFICANT CHANGE UP (ref 34.5–45)
HGB BLD-MCNC: 12.3 G/DL — SIGNIFICANT CHANGE UP (ref 11.5–15.5)
IANC: 10.09 K/UL — HIGH (ref 1.8–7.4)
IMM GRANULOCYTES NFR BLD AUTO: 0.3 % — SIGNIFICANT CHANGE UP (ref 0–0.9)
LIDOCAIN IGE QN: >3000 U/L — HIGH (ref 7–60)
LYMPHOCYTES # BLD AUTO: 0.91 K/UL — LOW (ref 1–3.3)
LYMPHOCYTES # BLD AUTO: 7.9 % — LOW (ref 13–44)
MCHC RBC-ENTMCNC: 23.8 PG — LOW (ref 27–34)
MCHC RBC-ENTMCNC: 31.9 GM/DL — LOW (ref 32–36)
MCV RBC AUTO: 74.7 FL — LOW (ref 80–100)
MONOCYTES # BLD AUTO: 0.38 K/UL — SIGNIFICANT CHANGE UP (ref 0–0.9)
MONOCYTES NFR BLD AUTO: 3.3 % — SIGNIFICANT CHANGE UP (ref 2–14)
NEUTROPHILS # BLD AUTO: 10.09 K/UL — HIGH (ref 1.8–7.4)
NEUTROPHILS NFR BLD AUTO: 87.9 % — HIGH (ref 43–77)
NRBC # BLD: 0 /100 WBCS — SIGNIFICANT CHANGE UP (ref 0–0)
NRBC # FLD: 0 K/UL — SIGNIFICANT CHANGE UP (ref 0–0)
PLATELET # BLD AUTO: 417 K/UL — HIGH (ref 150–400)
POTASSIUM SERPL-MCNC: 3.9 MMOL/L — SIGNIFICANT CHANGE UP (ref 3.5–5.3)
POTASSIUM SERPL-SCNC: 3.9 MMOL/L — SIGNIFICANT CHANGE UP (ref 3.5–5.3)
PROT SERPL-MCNC: 8.3 G/DL — SIGNIFICANT CHANGE UP (ref 6–8.3)
RBC # BLD: 5.17 M/UL — SIGNIFICANT CHANGE UP (ref 3.8–5.2)
RBC # FLD: 16.3 % — HIGH (ref 10.3–14.5)
SODIUM SERPL-SCNC: 139 MMOL/L — SIGNIFICANT CHANGE UP (ref 135–145)
TROPONIN T, HIGH SENSITIVITY RESULT: <6 NG/L — SIGNIFICANT CHANGE UP
WBC # BLD: 11.48 K/UL — HIGH (ref 3.8–10.5)
WBC # FLD AUTO: 11.48 K/UL — HIGH (ref 3.8–10.5)

## 2024-04-08 PROCEDURE — 99222 1ST HOSP IP/OBS MODERATE 55: CPT

## 2024-04-08 PROCEDURE — 76705 ECHO EXAM OF ABDOMEN: CPT | Mod: 26

## 2024-04-08 PROCEDURE — 99285 EMERGENCY DEPT VISIT HI MDM: CPT

## 2024-04-08 PROCEDURE — 71046 X-RAY EXAM CHEST 2 VIEWS: CPT | Mod: 26

## 2024-04-08 PROCEDURE — 99233 SBSQ HOSP IP/OBS HIGH 50: CPT | Mod: GC,57

## 2024-04-08 RX ORDER — MORPHINE SULFATE 50 MG/1
4 CAPSULE, EXTENDED RELEASE ORAL ONCE
Refills: 0 | Status: DISCONTINUED | OUTPATIENT
Start: 2024-04-08 | End: 2024-04-08

## 2024-04-08 RX ORDER — ACETAMINOPHEN 500 MG
675 TABLET ORAL EVERY 6 HOURS
Refills: 0 | Status: COMPLETED | OUTPATIENT
Start: 2024-04-08 | End: 2024-04-09

## 2024-04-08 RX ORDER — HYDROMORPHONE HYDROCHLORIDE 2 MG/ML
0.2 INJECTION INTRAMUSCULAR; INTRAVENOUS; SUBCUTANEOUS EVERY 6 HOURS
Refills: 0 | Status: DISCONTINUED | OUTPATIENT
Start: 2024-04-08 | End: 2024-04-09

## 2024-04-08 RX ORDER — ONDANSETRON 8 MG/1
4 TABLET, FILM COATED ORAL ONCE
Refills: 0 | Status: COMPLETED | OUTPATIENT
Start: 2024-04-08 | End: 2024-04-08

## 2024-04-08 RX ORDER — SODIUM CHLORIDE 9 MG/ML
1000 INJECTION INTRAMUSCULAR; INTRAVENOUS; SUBCUTANEOUS ONCE
Refills: 0 | Status: COMPLETED | OUTPATIENT
Start: 2024-04-08 | End: 2024-04-08

## 2024-04-08 RX ORDER — PIPERACILLIN AND TAZOBACTAM 4; .5 G/20ML; G/20ML
3.38 INJECTION, POWDER, LYOPHILIZED, FOR SOLUTION INTRAVENOUS ONCE
Refills: 0 | Status: DISCONTINUED | OUTPATIENT
Start: 2024-04-08 | End: 2024-04-08

## 2024-04-08 RX ORDER — ACETAMINOPHEN 500 MG
650 TABLET ORAL EVERY 6 HOURS
Refills: 0 | Status: DISCONTINUED | OUTPATIENT
Start: 2024-04-08 | End: 2024-04-08

## 2024-04-08 RX ORDER — PIPERACILLIN AND TAZOBACTAM 4; .5 G/20ML; G/20ML
3.38 INJECTION, POWDER, LYOPHILIZED, FOR SOLUTION INTRAVENOUS EVERY 8 HOURS
Refills: 0 | Status: DISCONTINUED | OUTPATIENT
Start: 2024-04-08 | End: 2024-04-08

## 2024-04-08 RX ORDER — SODIUM CHLORIDE 9 MG/ML
1000 INJECTION, SOLUTION INTRAVENOUS
Refills: 0 | Status: DISCONTINUED | OUTPATIENT
Start: 2024-04-08 | End: 2024-04-09

## 2024-04-08 RX ORDER — SODIUM CHLORIDE 9 MG/ML
1000 INJECTION INTRAMUSCULAR; INTRAVENOUS; SUBCUTANEOUS ONCE
Refills: 0 | Status: DISCONTINUED | OUTPATIENT
Start: 2024-04-08 | End: 2024-04-08

## 2024-04-08 RX ORDER — FAMOTIDINE 10 MG/ML
20 INJECTION INTRAVENOUS ONCE
Refills: 0 | Status: COMPLETED | OUTPATIENT
Start: 2024-04-08 | End: 2024-04-08

## 2024-04-08 RX ORDER — ENOXAPARIN SODIUM 100 MG/ML
40 INJECTION SUBCUTANEOUS EVERY 24 HOURS
Refills: 0 | Status: DISCONTINUED | OUTPATIENT
Start: 2024-04-08 | End: 2024-04-10

## 2024-04-08 RX ORDER — HYDROMORPHONE HYDROCHLORIDE 2 MG/ML
0.5 INJECTION INTRAMUSCULAR; INTRAVENOUS; SUBCUTANEOUS EVERY 6 HOURS
Refills: 0 | Status: DISCONTINUED | OUTPATIENT
Start: 2024-04-08 | End: 2024-04-09

## 2024-04-08 RX ADMIN — Medication 30 MILLILITER(S): at 06:43

## 2024-04-08 RX ADMIN — HYDROMORPHONE HYDROCHLORIDE 0.5 MILLIGRAM(S): 2 INJECTION INTRAMUSCULAR; INTRAVENOUS; SUBCUTANEOUS at 23:34

## 2024-04-08 RX ADMIN — HYDROMORPHONE HYDROCHLORIDE 0.2 MILLIGRAM(S): 2 INJECTION INTRAMUSCULAR; INTRAVENOUS; SUBCUTANEOUS at 13:43

## 2024-04-08 RX ADMIN — ENOXAPARIN SODIUM 40 MILLIGRAM(S): 100 INJECTION SUBCUTANEOUS at 17:00

## 2024-04-08 RX ADMIN — Medication 675 MILLIGRAM(S): at 21:00

## 2024-04-08 RX ADMIN — FAMOTIDINE 20 MILLIGRAM(S): 10 INJECTION INTRAVENOUS at 06:43

## 2024-04-08 RX ADMIN — SODIUM CHLORIDE 100 MILLILITER(S): 9 INJECTION, SOLUTION INTRAVENOUS at 13:31

## 2024-04-08 RX ADMIN — MORPHINE SULFATE 4 MILLIGRAM(S): 50 CAPSULE, EXTENDED RELEASE ORAL at 12:42

## 2024-04-08 RX ADMIN — ONDANSETRON 4 MILLIGRAM(S): 8 TABLET, FILM COATED ORAL at 16:59

## 2024-04-08 RX ADMIN — MORPHINE SULFATE 4 MILLIGRAM(S): 50 CAPSULE, EXTENDED RELEASE ORAL at 11:03

## 2024-04-08 RX ADMIN — SODIUM CHLORIDE 100 MILLILITER(S): 9 INJECTION, SOLUTION INTRAVENOUS at 20:34

## 2024-04-08 RX ADMIN — Medication 270 MILLIGRAM(S): at 14:58

## 2024-04-08 RX ADMIN — SODIUM CHLORIDE 1000 MILLILITER(S): 9 INJECTION INTRAMUSCULAR; INTRAVENOUS; SUBCUTANEOUS at 06:43

## 2024-04-08 RX ADMIN — ONDANSETRON 4 MILLIGRAM(S): 8 TABLET, FILM COATED ORAL at 06:43

## 2024-04-08 RX ADMIN — Medication 270 MILLIGRAM(S): at 20:26

## 2024-04-08 RX ADMIN — MORPHINE SULFATE 4 MILLIGRAM(S): 50 CAPSULE, EXTENDED RELEASE ORAL at 06:43

## 2024-04-08 RX ADMIN — HYDROMORPHONE HYDROCHLORIDE 0.5 MILLIGRAM(S): 2 INJECTION INTRAMUSCULAR; INTRAVENOUS; SUBCUTANEOUS at 23:50

## 2024-04-08 NOTE — CONSULT NOTE ADULT - SUBJECTIVE AND OBJECTIVE BOX
Chief Complaint:  Patient is a 23y old  Female who presents with a chief complaint of     HPI:AVANI DESAI is a 23y Female    PMHX/PSHX:  Anemia    No significant past surgical history    H/O  section      Allergies:  No Known Drug Allergies  peanuts (Vomiting)      Home Medications: reviewed  Hospital Medications:      Social History:   Tob: Denies  EtOH: Denies  Illicit Drugs: Denies    Family history:    Denies family history of colon cancer/polyps, stomach cancer/polyps, pancreatic cancer/masses, liver cancer/disease, ovarian cancer and endometrial cancer.    ROS:   General:  No  fevers, chills, night sweats, fatigue  Eyes:  Good vision, no reported pain  ENT:  No sore throat, pain, runny nose  CV:  No pain, palpitations  Pulm:  No dyspnea, cough  GI:  See HPI, otherwise negative  :  No  incontinence, nocturia  Muscle:  No pain, weakness  Neuro:  No memory problems  Psych:  No insomnia, mood problems, depression  Endocrine:  No polyuria, polydipsia, cold/heat intolerance  Heme:  No petechiae, ecchymosis, easy bruisability  Skin:  No rash    PHYSICAL EXAM:   Vital Signs:  Vital Signs Last 24 Hrs  T(C): 36.7 (2024 08:26), Max: 36.7 (2024 06:48)  T(F): 98 (2024 08:26), Max: 98 (2024 06:48)  HR: 68 (2024 08:26) (68 - 83)  BP: 119/65 (2024 08:26) (109/76 - 119/65)  BP(mean): --  RR: 17 (2024 08:26) (17 - 18)  SpO2: 100% (2024 08:26) (97% - 100%)    Parameters below as of 2024 08:26  Patient On (Oxygen Delivery Method): room air      Daily     Daily     GENERAL: no acute distress  NEURO: alert  HEENT: anicteric sclera, no conjunctival pallor appreciated  CHEST: no respiratory distress, no accessory muscle use  CARDIAC: regular rate, rhythm  ABDOMEN: soft, non-tender, non-distended, no rebound or guarding  EXTREMITIES: warm, well perfused, no edema  SKIN: no lesions noted    LABS: reviewed                        12.3   11.48 )-----------( 417      ( 2024 06:56 )             38.6     04-    139  |  103  |  7   ----------------------------<  103<H>  3.9   |  20<L>  |  0.75    Ca    9.6      2024 06:56    TPro  8.3  /  Alb  4.2  /  TBili  3.5<H>  /  DBili  x   /  AST  660<H>  /  ALT  674<H>  /  AlkPhos  214<H>      LIVER FUNCTIONS - ( 2024 06:56 )  Alb: 4.2 g/dL / Pro: 8.3 g/dL / ALK PHOS: 214 U/L / ALT: 674 U/L / AST: 660 U/L / GGT: x               Diagnostic Studies: see sunrise for full report         Chief Complaint:  Patient is a 23y old  Female who presents with a chief complaint of     HPI:AVANI DESAI is a 23y Female with  6 weeks PTA who presented to the     PMHX/PSHX:  Anemia    No significant past surgical history    H/O  section      Allergies:  No Known Drug Allergies  peanuts (Vomiting)      Home Medications: reviewed  Hospital Medications:      Social History:   Tob: Denies  EtOH: Denies  Illicit Drugs: Denies    Family history:    Denies family history of colon cancer/polyps, stomach cancer/polyps, pancreatic cancer/masses, liver cancer/disease, ovarian cancer and endometrial cancer.    ROS:   General:  No  fevers, chills, night sweats, fatigue  Eyes:  Good vision, no reported pain  ENT:  No sore throat, pain, runny nose  CV:  No pain, palpitations  Pulm:  No dyspnea, cough  GI:  See HPI, otherwise negative  :  No  incontinence, nocturia  Muscle:  No pain, weakness  Neuro:  No memory problems  Psych:  No insomnia, mood problems, depression  Endocrine:  No polyuria, polydipsia, cold/heat intolerance  Heme:  No petechiae, ecchymosis, easy bruisability  Skin:  No rash    PHYSICAL EXAM:   Vital Signs:  Vital Signs Last 24 Hrs  T(C): 36.7 (2024 08:26), Max: 36.7 (2024 06:48)  T(F): 98 (2024 08:26), Max: 98 (2024 06:48)  HR: 68 (2024 08:26) (68 - 83)  BP: 119/65 (2024 08:26) (109/76 - 119/65)  BP(mean): --  RR: 17 (2024 08:26) (17 - 18)  SpO2: 100% (2024 08:26) (97% - 100%)    Parameters below as of 2024 08:26  Patient On (Oxygen Delivery Method): room air      Daily     Daily     GENERAL: no acute distress  NEURO: alert  HEENT: anicteric sclera, no conjunctival pallor appreciated  CHEST: no respiratory distress, no accessory muscle use  CARDIAC: regular rate, rhythm  ABDOMEN: soft, non-tender, non-distended, no rebound or guarding  EXTREMITIES: warm, well perfused, no edema  SKIN: no lesions noted    LABS: reviewed                        12.3   11.48 )-----------( 417      ( 2024 06:56 )             38.6         139  |  103  |  7   ----------------------------<  103<H>  3.9   |  20<L>  |  0.75    Ca    9.6      2024 06:56    TPro  8.3  /  Alb  4.2  /  TBili  3.5<H>  /  DBili  x   /  AST  660<H>  /  ALT  674<H>  /  AlkPhos  214<H>      LIVER FUNCTIONS - ( 2024 06:56 )  Alb: 4.2 g/dL / Pro: 8.3 g/dL / ALK PHOS: 214 U/L / ALT: 674 U/L / AST: 660 U/L / GGT: x               Diagnostic Studies: see sunrise for full report         Chief Complaint:  Patient is a 23y old  Female who presents with a chief complaint of     HPI:AVANI DESAI is a 23y Female with  6 weeks PTA who presented to the ED with complaints of progressive chest pain and abdominal pain localized to the epigastric region with two episodes of NBNB vomiting. Noted to have lipase  > 3000 and elevated liver chem with bili 3.5 with cholelithiasis on US. Advanced GI consulted for gallstone pancreatitis.    Patient states on Thursday she started having a dull chest pain and thought it was heartburn so she took omeprazole and gas-x with minimal-no relief. After a couple of days she started experiencing the dull pain in her epigastric region which became sharp and radiated to her mid-back. Her sx progressed over the last several days so she came to the ED. Patient denies prior episodes of abdominal pain, but has had prior episodes of the dull chest pain for which she saw an outside GI x1 who she states put her on omeprazole with plans to do endoscopy if her sx did not improve. She denies f/c, diarrhea or constipation, no bloody red/black BMs. No smoking history or alcohol/illiciti  or IVDU. Fhx significant for GERD, celiac disease x 2, and Lupus. Denies fhx of malignancy or pancreatic disease. She denies prior EGD/colon.    She has been HDS and afebrile. WBC 11, T bili 3.5, , , .     PMHX/PSHX:  Anemia         H/O  section      Allergies:  No Known Drug Allergies  peanuts (Vomiting)      Home Medications: reviewed  Hospital Medications:      Social History:   Tob: Denies  EtOH: Denies  Illicit Drugs: Denies    Family history:    Celiac disease  Lupus    ROS:   Complete and normal except as mentioned above.    PHYSICAL EXAM:   Vital Signs:  Vital Signs Last 24 Hrs  T(C): 36.7 (2024 08:26), Max: 36.7 (2024 06:48)  T(F): 98 (2024 08:26), Max: 98 (2024 06:48)  HR: 68 (2024 08:26) (68 - 83)  BP: 119/65 (2024 08:26) (109/76 - 119/65)  BP(mean): --  RR: 17 (2024 08:26) (17 - 18)  SpO2: 100% (2024 08:26) (97% - 100%)    Parameters below as of 2024 08:26  Patient On (Oxygen Delivery Method): room air      Daily     Daily     GENERAL: no acute distress  NEURO: aox3  HEENT: anicteric sclera, no conjunctival pallor appreciated  CHEST: no respiratory distress, no accessory muscle use  CARDIAC: regular rate   ABDOMEN: soft, epigastric tenderness, non-distended, no rebound or guarding  EXTREMITIES: warm, well perfused, no edema  SKIN: no lesions noted    LABS:                          12.3   11.48 )-----------( 417      ( 2024 06:56 )             38.6     -    139  |  103  |  7   ----------------------------<  103<H>  3.9   |  20<L>  |  0.75    Ca    9.6      2024 06:56    TPro  8.3  /  Alb  4.2  /  TBili  3.5<H>  /  DBili  x   /  AST  660<H>  /  ALT  674<H>  /  AlkPhos  214<H>      LIVER FUNCTIONS - ( 2024 06:56 )  Alb: 4.2 g/dL / Pro: 8.3 g/dL / ALK PHOS: 214 U/L / ALT: 674 U/L / AST: 660 U/L / GGT: x               Diagnostic Studies:   US  reporting hepatic steatosis, Bile ducts: Normal caliber. Common bile duct measures 5 mm.  Gallbladder: Cholelithiasis. No localized tenderness or evidence of acute   cholecystitis.  Pancreas: Visualized portions are within normal limits.

## 2024-04-08 NOTE — H&P ADULT - ATTENDING COMMENTS
I saw and examined the patient and agree with the above note.    Gallstone pancreatitis/Choledocholithiasis  -Ductal imaging via lap anastasia and IOC/Spyglass LTCBE planned for tomorrow  -Abx, resuscitation and trend labs  -Serial abd exams  -NPO/IVF  -Pain control via IV meds    Sunil Dueñas MD   Acute and Critical Care Surgery  (Cell: [phone])  Email: [email or web address]    The Acute Care Surgery (B Team) Attending Group Practice:  Dr. Meche Dodge, Dr. Pato Herron, Dr. Oj Diaz, Dr. Sunil Dueñas and Dr. Lisette Shaw    Urgent issues - spectra [id number] or [id number]  Nonurgent issues - [phone]  Patient appointments or after hours - [phone]

## 2024-04-08 NOTE — ED PROVIDER NOTE - CLINICAL SUMMARY MEDICAL DECISION MAKING FREE TEXT BOX
23-year-old female G1, P1 s/p  6 weeks ago presents with chest and epigastric abdominal pain over the last 4 days. Concern for acute cholecystitis versus pancreatitis versus gastric ulcer.  Labs, EKG, upright CXR, RUQ ultrasound ordered. 23-year-old female G1, P1 s/p  6 weeks ago presents with chest and epigastric abdominal pain over the last 4 days. Concern for acute cholecystitis versus pancreatitis versus gastric ulcer.  Labs, EKG, upright CXR, RUQ ultrasound ordered.    Kon[place], Attending  See Attestation

## 2024-04-08 NOTE — ED ADULT NURSE NOTE - CHIEF COMPLAINT QUOTE
C/o epigastric pain x 4 days. also endorsing N/V with generalized abd pain. appears uncomfortable in triage.

## 2024-04-08 NOTE — CONSULT NOTE ADULT - NS ATTEND AMEND GEN_ALL_CORE FT
Patient seen and examined. Presenting with epigastric pain found to have gallstone pancreatitis. LFTs with bili 3.5. Of note, pt with significantly improved pain. Possibly passed stone. No signs of cholangitis, leukocytosis minimal at WBC 11, no fever. Nontoxic appearing. VSS. Recommend LR@250cc/hr after additional bolus (only received 1L NS in ER). Clear liquid diet. Obtain MRCP to ensure no choledocho. Trend LFTs.    Total time spent to complete patient's bedside assessment, physical examination, review medical chart including labs & imaging, discuss medical plan of care with housestaff was more than 50 minutes.

## 2024-04-08 NOTE — ED ADULT NURSE NOTE - ALCOHOL PRE SCREEN (AUDIT - C)
----- Message from Catherine Medina sent at 7/14/2017  9:34 AM EDT -----  PATIENT WENT TO  HER FIORICET -40 MG AND HAS ABOUT 16-18 MIGRAINES A MONTH AND THE DOCTOR ONLY ORDERED 10 PILLS.  SHE WAS SAYING SHE WOKE UP WITH ONE TODAY.  SHE SAID SHE DON'T UNDERSTAND WHY ONLY 10 PILLS TO LAST A WHOLE MONTH OF MIGRAINES.  SHE USES  [facility] [place]     [phone]      Pt is aware if she is having this many uncontrolled ntbs by alaina or neurologist,  Pt is very uipset about her medication being decreased  She does not understand why we keep dropping her medication as she continues to have 18 to 20 h/a per month.   She is wanting to be seen at the same time as her mother on the 20th of July.  She statedshe is having a lot of stress dealing with her mother,   She thinks this is causing all the h/a    
Statement Selected

## 2024-04-08 NOTE — ED ADULT NURSE NOTE - OBJECTIVE STATEMENT
23 year old AO4 ambulatory female c/o sharp generalized abd pain, s/p c section 6 weeks ago. States the pain started abruptly in ther upper abd area. States having nausea and vomiting. States not being able to have a BM and can not remember the last time she passed gas. Abd soft and nontender to the touch. No urinary symptoms.  RR even and unlabored. 20gIV placed in the left AC, labs drawn, medicated as per eMAR. Pending imaging.

## 2024-04-08 NOTE — ED PROVIDER NOTE - PROGRESS NOTE DETAILS
Florecita Bolivar DO (PGY-1): Patient signed out to me at shift change. 23F with epigastric pain starting at 12am, with chest pain since Thursday. Associated n/v. Hx , no other abd surgeries. Abd exam with epigastric and RUQ tenderness. States pain has improved slightly with meds, worsens with movement. Pending labs and CT abd/pel, reassess. Florecita Bolivar DO (PGY-1): Patient signed out to me at shift change. 23F with epigastric pain starting at 12am, with chest pain since Thursday. Associated n/v. Hx , no other abd surgeries. Abd exam with epigastric and RUQ tenderness. States pain has improved slightly with meds, worsens with movement. Pending labs and RUQ US, reassess. If negative with persistent pain, consider CT abd/pel. Patient agrees with plan. Florecita Bolivar,  (PGY-1): Labs reviewed - elevated LFTs and lipase > 3k. Ultrasound with presence of gallstones. Pending official read. Informed patient of results. Will keep NPO at this time. Florecita Bolivar DO (PGY-1): GI and gen surg consulted. Florecita Bolivar DO (PGY-1): Patient to be admitted to surgery, Dr. Dueñas.

## 2024-04-08 NOTE — H&P ADULT - ASSESSMENT
24 yo F w/ PMHx of  6 weeks presented to the ED with complaints of stable substernal chest pain x4 days now localized to epigastrium associated w/ NBNB emesis. Patient states has similar substernal chest pain during pregnancy however attributed to reflux. In the ED, AVS stable. Labs show  WBC 11, T bili 3.5, , , , lipase >3000. RUQ US w/ gallstone, CBD 5mm. Surgery consulted for evaluation of gallstone pancreatitis       Plan:  - Admit to B Team Surgery under Dr. Dueñas   - Diet: NPO  - IVF: LR@100  - Pain control PRN   - F/u repeat labs tomorrow AM   - Will plan for lap anastasia, poss admission w/ IOC   - Dispo: surgical floor     Discussed w/ Dr. Dueñas       B Team Surgery   [id number]

## 2024-04-08 NOTE — ED PROVIDER NOTE - PHYSICAL EXAMINATION
GENERAL: NAD  HEAD: normocephalic, atraumatic  HEENT: normal conjunctiva, oral mucosa moist, uvula midline, neck supple  CARDIAC: well perfused  PULM: speaking in full sentences  GI: abdomen nondistended, soft, epigastric ttp  :  no suprapubic tenderness  NEURO: moving all 4 extremities, no focal deficits, normal speech, AOx3  MSK: no peripheral edema, no calf tenderness b/l  SKIN: well-perfused, extremities warm

## 2024-04-08 NOTE — ED PROVIDER NOTE - OBJECTIVE STATEMENT
23-year-old female G1, P1 s/p  6 weeks ago presents with chest and epigastric abdominal pain over the last 4 days.  On Thursday she began having chest pain that migrated towards epigastric abdominal pain days.  She states the pain has been increasing and she was unable to lie flat earlier this evening so presented to the ED for further evaluation.  Patient states she has been having shortness of breath accompanied by the abdominal pain.  She has not tried any medications to address her pain.  She also had 2 episodes of vomiting earlier today.  She otherwise has no significant headache, diarrhea, dysuria, peripheral edema, fever/chills.  LMP ended Wednesday (first after pregnancy).  NKDA.  No substance use.

## 2024-04-08 NOTE — ED ADULT NURSE REASSESSMENT NOTE - NS ED NURSE REASSESS COMMENT FT1
Er report taking from night shift pt AOX 3 ambulatory to ER, s/p US, pt stated she had similar symptoms mild prior to pregnancy, during pregnancy and after pregnancy pt stated the discomfort if more frequently and painful pt stated she was not Dx with gall stones in the past.   kept pt npo, pending US results.      Cari Eugene RN

## 2024-04-08 NOTE — ED PROVIDER NOTE - CARE PLAN
1 Principal Discharge DX:	Epigastric abdominal pain   Principal Discharge DX:	Acute pancreatitis  Secondary Diagnosis:	Cholelithiases

## 2024-04-08 NOTE — ED ADULT TRIAGE NOTE - CHIEF COMPLAINT QUOTE
C/o epigastric pain x 4 days. endorsing N/V and generalized abd pain. appears uncomfortable in triage. C/o epigastric pain x 4 days. also endorsing N/V with generalized abd pain. appears uncomfortable in triage.

## 2024-04-08 NOTE — ED ADULT NURSE REASSESSMENT NOTE - NS ED NURSE REASSESS COMMENT FT1
Pt noted to be in no acute distress. Denies headache, dizziness, chest pain, SOB, nausea, vomiting. Pending US and Xray.

## 2024-04-08 NOTE — H&P ADULT - NSHPPHYSICALEXAM_GEN_ALL_CORE
GEN: NAD, resting quietly  NEURO: AAOx3, no focal deficits  PULM: symmetric chest rise bilaterally, no increased WOB  CV: appears well perfused   ABD: Soft, Non distended, tender to palpation in epigastrium without rebound tenderness/guarding/rigidity  EXTR: no cyanosis or edema, moving all extremities

## 2024-04-08 NOTE — H&P ADULT - HISTORY OF PRESENT ILLNESS
24 yo F w/ PMHx of  6 weeks presented to the ED with complaints of stable substernal chest pain x4 days now localized to epigastrium associated w/ NBNB emesis. Patient states has similar substernal chest pain during pregnancy however attributed to reflux.     In the ED, AVS stable. Labs show  WBC 11, T bili 3.5, , , , lipase >3000. RUQ US w/ gallstone, CBD 5mm. Surgery consulted for evaluation       Patient w/ FMHx of  GERD, celiac disease, and Lupus. No prior EGD/colon.

## 2024-04-08 NOTE — ED PROVIDER NOTE - ATTENDING CONTRIBUTION TO CARE
I performed a history and physical exam of the patient and discussed their management with the resident/fellow/ACP/student. I have reviewed the resident/fellow/ACP/student note and agree with the documented findings and plan of care, except as noted. I have personally performed a substantive portion of the visit including all aspects of the medical decision making. My medical decision making and observations are found above. Please refer to any progress notes for updates on clinical course.    23-year-old female with no past medical history, recent  6 weeks ago, presenting with abdominal pain and vomiting.  Started acutely around 12 AM, woke patient up from sleep, localized to epigastric region, intermittently radiating to back with no associated fever/chills, diarrhea, cough, rashes, dysuria, hematuria, vaginal bleeding or discharge.  Denies any other abdominal surgeries.  Reports few episodes of nonbloody emesis. Reports pain was localized to chest 4 days ago and has since moved to abdomen with no associated LOC, palpitations, diaphoresis, shortness of breath, focal weakness, numbness/tingling. Denies any leg swelling/pain, hemoptysis, SOB, recent travel/bed bound nature. LMP ended Wednesday. No chest pain at this time.     Gen: WDWN, uncomfortable appearing, afebrile, hemodynamically stable   HEENT: No nasal discharge, mucous membranes moist   CV: RRR, +S1/S2, no M/R/G, equal b/l radial pulses 2+  Resp: CTAB, no W/R/R, SPO2 >95% on RA, no increased WOB   GI: Abdomen soft non-distended, TTP in RUQ/epigastric region with guarding and no rebound, no masses/organomegaly   MSK/Skin: No CVA tenderness, no open wounds, no bruising, no LE edema  Neuro: A&Ox4, moving all 4 extremities spontaneously, gross sensation intact in UE and LE BL  Psych: appropriate mood     MDM:   23-year-old female with no past medical history, recent  6 weeks ago, presenting with abdominal pain and vomiting, RUQ/epigastric TTP with guarding with no rebound, afebrile, hemodynamically stable, but uncomfortable appearing.  Exam/history concerning for but not limited to cholecystitis/cholelithiasis versus pancreatitis versus low suspicion for atypical ACS: EKG nonischemic.  Will evaluate with basic labs, hCG, troponin, right upper quadrant ultrasound, and continue to reassess.  Patient seen close at signout: If pain not relieved with medications/ultrasound negative will signout plan to consider CT abdomen pelvis for further exploration

## 2024-04-08 NOTE — H&P ADULT - NSHPLABSRESULTS_GEN_ALL_CORE
12.3   11.48 )-----------( 417      ( 08 Apr 2024 06:56 )             38.6       04-08    139  |  103  |  7   ----------------------------<  103<H>  3.9   |  20<L>  |  0.75    Ca    9.6      08 Apr 2024 06:56    TPro  8.3  /  Alb  4.2  /  TBili  3.5<H>  /  DBili  x   /  AST  660<H>  /  ALT  674<H>  /  AlkPhos  214<H>  04-08

## 2024-04-08 NOTE — CONSULT NOTE ADULT - ASSESSMENT
# Gallstone pancreatitis  Patient with cholelithiasis w/o cholecystitis and recent birth about 6 weeks prior. Findings most consistent with gallstone pancreatitis. However, patient has strong family history of celiac disease and lupus, reasonable to consider evaluation of this as well.    # Chest pain, atypical  Potentially due to GERD.    Recommendation:  - Tentatively plan for EGD/ERCP Tuesday   - Cardiac evaluation   - Check MRCP today  - Diet as tolerated today  - NPO after midnight  - 3 AM CBC, CMP, coags; correct electrolytes via IV  - Transfuse if Hgb < 8  - Check IGG4  - PPI 40 daily  - Ensure adequate hydration with IV LR  - Antiemetics as needed  - Adequate pain control  - Surgery consult, evaluation for CCY  - Rest of care per primary    Preliminary note until signed by Attending.    Thank you for involving us in this patient's care. Please reach out if any further questions or concerns.    Radha Hansen MD  Gastroenterology/Hepatology Fellow, PGY-7    NON-URGENT CONSULTS:  Please email [email or web address]  [email or web address]  AT NIGHT AND ON WEEKENDS:  Contact on-call GI fellow via answering service ([phone]) from 5pm-8am and on weekends/holidays  MONDAY-FRIDAY 8AM-5PM:  Pager: [phone] ([facility])  Pager: [phone] ([facility])   # Gallstone pancreatitis  Patient with cholelithiasis w/o cholecystitis and recent birth about 6 weeks prior. Elevated liver chem/bili and lipase with overall clinical picture, most consistent with gallstone pancreatitis. However, patient has strong family history of celiac disease and lupus, reasonable to consider evaluation of this as well, though unlikely etiology of acute findings.  - US 4/8 reporting hepatic steatosis, Bile ducts: Normal caliber. Common bile duct measures 5 mm. Cholelithiasis. No localized tenderness or evidence of acute cholecystitis. Pancreas: Visualized portions are within normal limits.      # Chest pain, atypical - resolved  Potentially due to GERD.    Recommendation:  - Tentatively plan for EGD/ERCP Tuesday   - Evaluate chest pain, defer work-up to primary  - Check MRCP today  - Diet as tolerated today  - NPO after midnight  - 3 AM CBC, CMP, coags; correct electrolytes via IV  - Transfuse if Hgb < 8  - Check IGG4  - PPI 40 daily  - Ensure adequate hydration with IV LR  - Antiemetics as needed  - Adequate pain control  - Surgery consult, evaluation for CCY  - Rest of care per primary    Preliminary note until signed by Attending.    Thank you for involving us in this patient's care. Please reach out if any further questions or concerns.    Radha Hansen MD  Gastroenterology/Hepatology Fellow, PGY-7    NON-URGENT CONSULTS:  Please email [email or web address] [place]  [email or web address]  AT NIGHT AND ON WEEKENDS:  Contact on-call GI fellow via answering service ([phone]) from 5pm-8am and on weekends/holidays  MONDAY-FRIDAY 8AM-5PM:  Pager: [phone] ([facility])  Pager: [phone] ([facility])   # Gallstone pancreatitis  Patient with cholelithiasis w/o cholecystitis and recent birth about 6 weeks prior. Elevated liver chem/bili and lipase with overall clinical picture, most consistent with gallstone pancreatitis. However, patient has strong family history of celiac disease and lupus, reasonable to consider evaluation of this as well, though unlikely etiology of acute findings.  - US 4/8 reporting hepatic steatosis, Bile ducts: Normal caliber. Common bile duct measures 5 mm. Cholelithiasis. No localized tenderness or evidence of acute cholecystitis. Pancreas: Visualized portions are within normal limits.      # Chest pain, atypical - resolved  Potentially due to GERD.    Recommendation:  - Will consider EGD/ERCP Tuesday, pending MRCP findings and patient's condition   - Evaluate chest pain, defer work-up to primary  - Check MRCP today  - Diet as tolerated today  - NPO after midnight  - 3 AM CBC, CMP, coags; correct electrolytes via IV  - Transfuse if Hgb < 7  - Check IGG4  - PPI 40 daily  - Ensure adequate hydration with IV LR  - Antiemetics as needed  - Adequate pain control  - Surgery consult, evaluation for CCY  - Rest of care per primary    Preliminary note until signed by Attending.    Thank you for involving us in this patient's care. Please reach out if any further questions or concerns.    Radha Hansen MD  Gastroenterology/Hepatology Fellow, PGY-7    NON-URGENT CONSULTS:  Please email [email or web address]  [email or web address]  AT NIGHT AND ON WEEKENDS:  Contact on-call GI fellow via answering service ([phone]) from 5pm-8am and on weekends/holidays  MONDAY-FRIDAY 8AM-5PM:  Pager: [phone] ([facility])  Pager: [phone] ([facility])   # Gallstone pancreatitis  Patient with cholelithiasis w/o cholecystitis and recent birth about 6 weeks prior. Elevated liver chem/bili and lipase with overall clinical picture, most consistent with gallstone pancreatitis. However, patient has strong family history of celiac disease and lupus, reasonable to consider evaluation of this as well, though unlikely etiology of acute findings.  - US 4/8 reporting hepatic steatosis, Bile ducts: Normal caliber. Common bile duct measures 5 mm. Cholelithiasis. No localized tenderness or evidence of acute cholecystitis. Pancreas: Visualized portions are within normal limits.      # Chest pain, atypical - resolved  Potentially due to GERD.    Recommendation:  - Evaluate chest pain, defer work-up to primary  - Check MRCP today  - Diet as tolerated today  - NPO after midnight  - 3 AM CBC, CMP, coags; correct electrolytes via IV  - Transfuse if Hgb < 7  - Check IGG4  - PPI 40 daily  - Ensure adequate hydration with IV LR  - Antiemetics as needed  - Adequate pain control  - Surgery consult, evaluation for CCY  - Rest of care per primary    Preliminary note until signed by Attending.    Thank you for involving us in this patient's care. Please reach out if any further questions or concerns.    Radha Hansen MD  Gastroenterology/Hepatology Fellow, PGY-7    NON-URGENT CONSULTS:  Please email [email or web address] [place]  [email or web address]  AT NIGHT AND ON WEEKENDS:  Contact on-call GI fellow via answering service ([phone]) from 5pm-8am and on weekends/holidays  MONDAY-FRIDAY 8AM-5PM:  Pager: [phone] ([facility])  Pager: [phone] ([facility])

## 2024-04-09 ENCOUNTER — RESULT REVIEW (OUTPATIENT)
Age: 24
End: 2024-04-09

## 2024-04-09 LAB
ADD ON TEST-SPECIMEN IN LAB: SIGNIFICANT CHANGE UP
ALBUMIN SERPL ELPH-MCNC: 3.6 G/DL — SIGNIFICANT CHANGE UP (ref 3.3–5)
ALP SERPL-CCNC: 159 U/L — HIGH (ref 40–120)
ALT FLD-CCNC: 439 U/L — HIGH (ref 4–33)
ANION GAP SERPL CALC-SCNC: 17 MMOL/L — HIGH (ref 7–14)
APTT BLD: 36.3 SEC — HIGH (ref 24.5–35.6)
AST SERPL-CCNC: 153 U/L — HIGH (ref 4–32)
BILIRUB DIRECT SERPL-MCNC: 0.2 MG/DL — SIGNIFICANT CHANGE UP (ref 0–0.3)
BILIRUB INDIRECT FLD-MCNC: 0.4 MG/DL — SIGNIFICANT CHANGE UP (ref 0–1)
BILIRUB SERPL-MCNC: 0.6 MG/DL — SIGNIFICANT CHANGE UP (ref 0.2–1.2)
BLD GP AB SCN SERPL QL: POSITIVE — SIGNIFICANT CHANGE UP
BUN SERPL-MCNC: 8 MG/DL — SIGNIFICANT CHANGE UP (ref 7–23)
CALCIUM SERPL-MCNC: 8.8 MG/DL — SIGNIFICANT CHANGE UP (ref 8.4–10.5)
CHLORIDE SERPL-SCNC: 107 MMOL/L — SIGNIFICANT CHANGE UP (ref 98–107)
CO2 SERPL-SCNC: 16 MMOL/L — LOW (ref 22–31)
CREAT SERPL-MCNC: 0.61 MG/DL — SIGNIFICANT CHANGE UP (ref 0.5–1.3)
EGFR: 128 ML/MIN/1.73M2 — SIGNIFICANT CHANGE UP
GLUCOSE SERPL-MCNC: 57 MG/DL — LOW (ref 70–99)
HCT VFR BLD CALC: 34.9 % — SIGNIFICANT CHANGE UP (ref 34.5–45)
HGB BLD-MCNC: 10.4 G/DL — LOW (ref 11.5–15.5)
INR BLD: 1.12 RATIO — SIGNIFICANT CHANGE UP (ref 0.85–1.18)
MAGNESIUM SERPL-MCNC: 1.9 MG/DL — SIGNIFICANT CHANGE UP (ref 1.6–2.6)
MCHC RBC-ENTMCNC: 23.4 PG — LOW (ref 27–34)
MCHC RBC-ENTMCNC: 29.8 GM/DL — LOW (ref 32–36)
MCV RBC AUTO: 78.4 FL — LOW (ref 80–100)
NRBC # BLD: 0 /100 WBCS — SIGNIFICANT CHANGE UP (ref 0–0)
NRBC # FLD: 0 K/UL — SIGNIFICANT CHANGE UP (ref 0–0)
PHOSPHATE SERPL-MCNC: 3.1 MG/DL — SIGNIFICANT CHANGE UP (ref 2.5–4.5)
PLATELET # BLD AUTO: 362 K/UL — SIGNIFICANT CHANGE UP (ref 150–400)
POTASSIUM SERPL-MCNC: 4.2 MMOL/L — SIGNIFICANT CHANGE UP (ref 3.5–5.3)
POTASSIUM SERPL-SCNC: 4.2 MMOL/L — SIGNIFICANT CHANGE UP (ref 3.5–5.3)
PROT SERPL-MCNC: 6.9 G/DL — SIGNIFICANT CHANGE UP (ref 6–8.3)
PROTHROM AB SERPL-ACNC: 12.5 SEC — SIGNIFICANT CHANGE UP (ref 9.5–13)
RBC # BLD: 4.45 M/UL — SIGNIFICANT CHANGE UP (ref 3.8–5.2)
RBC # FLD: 16.9 % — HIGH (ref 10.3–14.5)
RH IG SCN BLD-IMP: POSITIVE — SIGNIFICANT CHANGE UP
SODIUM SERPL-SCNC: 140 MMOL/L — SIGNIFICANT CHANGE UP (ref 135–145)
WBC # BLD: 10.85 K/UL — HIGH (ref 3.8–10.5)
WBC # FLD AUTO: 10.85 K/UL — HIGH (ref 3.8–10.5)

## 2024-04-09 PROCEDURE — 86077 PHYS BLOOD BANK SERV XMATCH: CPT

## 2024-04-09 PROCEDURE — 47562 LAPAROSCOPIC CHOLECYSTECTOMY: CPT

## 2024-04-09 PROCEDURE — 99232 SBSQ HOSP IP/OBS MODERATE 35: CPT | Mod: 57

## 2024-04-09 PROCEDURE — 88304 TISSUE EXAM BY PATHOLOGIST: CPT | Mod: 26

## 2024-04-09 DEVICE — LIGATING CLIPS WECK HEMOLOK POLYMER LARGE (PURPLE) 6: Type: IMPLANTABLE DEVICE | Site: ABDOMINAL | Status: FUNCTIONAL

## 2024-04-09 RX ORDER — INFLUENZA VIRUS VACCINE 15; 15; 15; 15 UG/.5ML; UG/.5ML; UG/.5ML; UG/.5ML
0.5 SUSPENSION INTRAMUSCULAR ONCE
Refills: 0 | Status: DISCONTINUED | OUTPATIENT
Start: 2024-04-09 | End: 2024-04-10

## 2024-04-09 RX ORDER — OXYCODONE HYDROCHLORIDE 5 MG/1
5 TABLET ORAL EVERY 4 HOURS
Refills: 0 | Status: DISCONTINUED | OUTPATIENT
Start: 2024-04-09 | End: 2024-04-10

## 2024-04-09 RX ORDER — ONDANSETRON 8 MG/1
4 TABLET, FILM COATED ORAL ONCE
Refills: 0 | Status: COMPLETED | OUTPATIENT
Start: 2024-04-09 | End: 2024-04-09

## 2024-04-09 RX ORDER — ONDANSETRON 8 MG/1
4 TABLET, FILM COATED ORAL ONCE
Refills: 0 | Status: DISCONTINUED | OUTPATIENT
Start: 2024-04-09 | End: 2024-04-09

## 2024-04-09 RX ORDER — HYDROMORPHONE HYDROCHLORIDE 2 MG/ML
0.5 INJECTION INTRAMUSCULAR; INTRAVENOUS; SUBCUTANEOUS
Refills: 0 | Status: DISCONTINUED | OUTPATIENT
Start: 2024-04-09 | End: 2024-04-09

## 2024-04-09 RX ORDER — HYDROMORPHONE HYDROCHLORIDE 2 MG/ML
1 INJECTION INTRAMUSCULAR; INTRAVENOUS; SUBCUTANEOUS
Refills: 0 | Status: DISCONTINUED | OUTPATIENT
Start: 2024-04-09 | End: 2024-04-09

## 2024-04-09 RX ORDER — ACETAMINOPHEN 500 MG
975 TABLET ORAL EVERY 6 HOURS
Refills: 0 | Status: DISCONTINUED | OUTPATIENT
Start: 2024-04-09 | End: 2024-04-10

## 2024-04-09 RX ADMIN — Medication 675 MILLIGRAM(S): at 02:30

## 2024-04-09 RX ADMIN — Medication 270 MILLIGRAM(S): at 08:59

## 2024-04-09 RX ADMIN — Medication 270 MILLIGRAM(S): at 01:58

## 2024-04-09 RX ADMIN — HYDROMORPHONE HYDROCHLORIDE 0.5 MILLIGRAM(S): 2 INJECTION INTRAMUSCULAR; INTRAVENOUS; SUBCUTANEOUS at 13:21

## 2024-04-09 RX ADMIN — OXYCODONE HYDROCHLORIDE 5 MILLIGRAM(S): 5 TABLET ORAL at 15:51

## 2024-04-09 RX ADMIN — HYDROMORPHONE HYDROCHLORIDE 0.5 MILLIGRAM(S): 2 INJECTION INTRAMUSCULAR; INTRAVENOUS; SUBCUTANEOUS at 12:53

## 2024-04-09 RX ADMIN — ENOXAPARIN SODIUM 40 MILLIGRAM(S): 100 INJECTION SUBCUTANEOUS at 17:49

## 2024-04-09 RX ADMIN — Medication 975 MILLIGRAM(S): at 17:49

## 2024-04-09 RX ADMIN — Medication 975 MILLIGRAM(S): at 18:49

## 2024-04-09 RX ADMIN — ONDANSETRON 4 MILLIGRAM(S): 8 TABLET, FILM COATED ORAL at 21:50

## 2024-04-09 RX ADMIN — SODIUM CHLORIDE 100 MILLILITER(S): 9 INJECTION, SOLUTION INTRAVENOUS at 09:32

## 2024-04-09 RX ADMIN — OXYCODONE HYDROCHLORIDE 5 MILLIGRAM(S): 5 TABLET ORAL at 21:35

## 2024-04-09 RX ADMIN — HYDROMORPHONE HYDROCHLORIDE 0.5 MILLIGRAM(S): 2 INJECTION INTRAMUSCULAR; INTRAVENOUS; SUBCUTANEOUS at 06:08

## 2024-04-09 RX ADMIN — OXYCODONE HYDROCHLORIDE 5 MILLIGRAM(S): 5 TABLET ORAL at 22:05

## 2024-04-09 RX ADMIN — OXYCODONE HYDROCHLORIDE 5 MILLIGRAM(S): 5 TABLET ORAL at 16:51

## 2024-04-09 RX ADMIN — HYDROMORPHONE HYDROCHLORIDE 0.5 MILLIGRAM(S): 2 INJECTION INTRAMUSCULAR; INTRAVENOUS; SUBCUTANEOUS at 06:23

## 2024-04-09 NOTE — CHART NOTE - NSCHARTNOTEFT_GEN_A_CORE
Brief Update Note    Discussed case with Surgery; patient s/p CCY, IOC not performed.   Patient with significant drop in liver chem.  If had stone, may have passed it. If concerned for stone retention, please check MRCP and call back with results. Otherwise, continue to monitor liver chem, if uptrending/persistently elevated or with persistent abdominal pain, can check MRCP and call back with results.  Rest of care per surgery    Thank you for involving us in this patient's care. Please reach out if any further questions or concerns. Signing off.    Radha Hansen MD  Gastroenterology/Hepatology Fellow, PGY-7    NON-URGENT CONSULTS:  Please email [email or web address]  [email or web address]  AT NIGHT AND ON WEEKENDS:  Contact on-call GI fellow via answering service ([phone]) from 5pm-8am and on weekends/holidays  MONDAY-FRIDAY 8AM-5PM:  Pager: [phone] ([facility])  Pager: [phone] ([facility])
SURGERY POST OP CHECK    STATUS POST PROCEDURE:    SUBJECTIVE: Pt seen and examined without complaints. Pain is controlled. Denies CP/SOB/N/V.       OBJECTIVE:  Vital Signs Last 24 Hrs  T(C): 36.6 (2024 14:00), Max: 37.3 (2024 09:39)  T(F): 97.8 (2024 14:00), Max: 99.2 (2024 09:39)  HR: 77 (2024 14:00) (60 - 111)  BP: 122/61 (2024 14:00) (101/60 - 132/73)  BP(mean): 74 (2024 14:00) (69 - 87)  RR: 16 (2024 14:00) (12 - 22)  SpO2: 96% (2024 14:00) (94% - 100%)    Parameters below as of 2024 12:35  Patient On (Oxygen Delivery Method): room air      I&O's Summary    2024 07:01  -  2024 07:00  --------------------------------------------------------  IN: 1134 mL / OUT: 0 mL / NET: 1134 mL        PHYSICAL EXAM:  Gen: NAD, A&Ox3  Pulm: No respiratory distress, no subcostal retractions  CV: RRR, no JVD  Abd: Soft, NT, ND, port sites c/d/i  Drains: KRISTEL x   Extremities: Grossly symmetric     ASSESSMENT/PLAN: HPI:  22 yo F w/ PMHx of  6 weeks presented to the ED with complaints of stable substernal chest pain x4 days now localized to epigastrium associated w/ NBNB emesis.     Now s/p laparoscopic cholecystectomy. Tolerated procedure well .     Plan     Regular diet   Pain control PRN  f/u AM labs   Plan for DC tomorrow     B Team   [id number]

## 2024-04-09 NOTE — PROGRESS NOTE ADULT - SUBJECTIVE AND OBJECTIVE BOX
B TEAM SURGERY DAILY PROGRESS NOTE    HOSPITAL DAY #:  1    Vital Signs Last 24 Hrs  T(C): 36.9 (09 Apr 2024 10:00), Max: 37.3 (09 Apr 2024 09:39)  T(F): 98.5 (09 Apr 2024 10:00), Max: 99.2 (09 Apr 2024 09:39)  HR: 104 (09 Apr 2024 10:00) (60 - 104)  BP: 123/59 (09 Apr 2024 10:00) (101/60 - 128/63)  BP(mean): --  RR: 16 (09 Apr 2024 10:00) (16 - 18)  SpO2: 100% (09 Apr 2024 10:00) (97% - 100%)    Parameters below as of 09 Apr 2024 05:17  Patient On (Oxygen Delivery Method): room air          SUBJECTIVE: Pt seen and examined at bedside. Patient reports epigastric abdominal pain. Has not yet had PO. Denies chest pain, SOB, nausea or vomiting.       General Appearance: Appears well, NAD, resting comfortably in bed  Neck: Supple  Chest: Equal expansion bilaterally, equal breath sounds  CV: Pulse regular presently  Abdomen: Soft, non distended, tender to palpation in epigastric region without rebound or guarding   Extremities: Grossly symmetric, SCD's in place     I&O's Summary    08 Apr 2024 07:01  -  09 Apr 2024 07:00  --------------------------------------------------------  IN: 1134 mL / OUT: 0 mL / NET: 1134 mL      I&O's Detail    08 Apr 2024 07:01  -  09 Apr 2024 07:00  --------------------------------------------------------  IN:    IV PiggyBack: 134 mL    Lactated Ringers: 1000 mL  Total IN: 1134 mL    OUT:    Oral Fluid: 0 mL  Total OUT: 0 mL    Total NET: 1134 mL          MEDICATIONS  (STANDING):  enoxaparin Injectable 40 milliGRAM(s) SubCutaneous every 24 hours  influenza   Vaccine 0.5 milliLiter(s) IntraMuscular once  lactated ringers. 1000 milliLiter(s) (100 mL/Hr) IV Continuous <Continuous>    MEDICATIONS  (PRN):  HYDROmorphone  Injectable 0.5 milliGRAM(s) IV Push every 10 minutes PRN Moderate Pain (4 - 6)  HYDROmorphone  Injectable 1 milliGRAM(s) IV Push every 10 minutes PRN Severe Pain (7 - 10)  HYDROmorphone  Injectable 0.2 milliGRAM(s) IV Push every 6 hours PRN Moderate Pain (4 - 6)  HYDROmorphone  Injectable 0.5 milliGRAM(s) IV Push every 6 hours PRN Severe Pain (7 - 10)  ondansetron Injectable 4 milliGRAM(s) IV Push once PRN Nausea and/or Vomiting      LABS:                        10.4   10.85 )-----------( 362      ( 09 Apr 2024 05:34 )             34.9     04-09    140  |  107  |  8   ----------------------------<  57<L>  4.2   |  16<L>  |  0.61    Ca    8.8      09 Apr 2024 05:34  Phos  3.1     04-09  Mg     1.90     04-09    TPro  6.9  /  Alb  3.6  /  TBili  0.6  /  DBili  0.2  /  AST  153<H>  /  ALT  439<H>  /  AlkPhos  159<H>  04-09    PT/INR - ( 09 Apr 2024 05:34 )   PT: 12.5 sec;   INR: 1.12 ratio         PTT - ( 09 Apr 2024 05:34 )  PTT:36.3 sec  Urinalysis Basic - ( 09 Apr 2024 05:34 )    Color: x / Appearance: x / SG: x / pH: x  Gluc: 57 mg/dL / Ketone: x  / Bili: x / Urobili: x   Blood: x / Protein: x / Nitrite: x   Leuk Esterase: x / RBC: x / WBC x   Sq Epi: x / Non Sq Epi: x / Bacteria: x        RADIOLOGY & ADDITIONAL STUDIES:

## 2024-04-09 NOTE — PROGRESS NOTE ADULT - ASSESSMENT
22 yo F w/ PMHx of  6 weeks presented to the ED with complaints of stable substernal chest pain x4 days localized to epigastrium associated w/ NBNB emesis. Patient states has similar substernal chest pain during pregnancy however attributed to reflux. In the ED, AVS stable. Labs show  WBC 11, T bili 3.5, , , , lipase >3000. RUQ US w/ gallstone, CBD 5mm. Surgery consulted for evaluation of gallstone pancreatitis   - Diet: NPO  - IVF: LR@100  - Pain control PRN   -DVT ppx   -Follow up AM LFTs, if bilirubin downtrending plan for OR for laparoscopic cholecystectomy      Danielle Lopez PA-C  B Team Surgery #[id number]  Please page with any questions or concerns

## 2024-04-09 NOTE — PROGRESS NOTE ADULT - ATTENDING COMMENTS
The patient was seen and examined, chart and notes reviewed.  The current diagnosis, plan of care and alternatives have been discussed with the patient.  All questions have been answered and updates have been discussed.  The case was discussed with B team residents/PA's and medical students at morning B surgical rounds and throughout the course of the day.    Symptomatic cholelithiasis  a.  TB normalized in AM labs  b.  Keep NPO at this time  c.  Continue IVF resuscitation  d.  Plan for laparoscopic cholecystectomy  e.  Discuss possible complications which include but not limited to bleeding, infection , duct injury and retained CBD stone

## 2024-04-10 ENCOUNTER — TRANSCRIPTION ENCOUNTER (OUTPATIENT)
Age: 24
End: 2024-04-10

## 2024-04-10 VITALS
DIASTOLIC BLOOD PRESSURE: 63 MMHG | OXYGEN SATURATION: 100 % | TEMPERATURE: 98 F | RESPIRATION RATE: 18 BRPM | HEART RATE: 63 BPM | SYSTOLIC BLOOD PRESSURE: 117 MMHG

## 2024-04-10 LAB
ALBUMIN SERPL ELPH-MCNC: 3.8 G/DL — SIGNIFICANT CHANGE UP (ref 3.3–5)
ALP SERPL-CCNC: 145 U/L — HIGH (ref 40–120)
ALT FLD-CCNC: 302 U/L — HIGH (ref 4–33)
ANION GAP SERPL CALC-SCNC: 11 MMOL/L — SIGNIFICANT CHANGE UP (ref 7–14)
AST SERPL-CCNC: 61 U/L — HIGH (ref 4–32)
BILIRUB SERPL-MCNC: 0.5 MG/DL — SIGNIFICANT CHANGE UP (ref 0.2–1.2)
BUN SERPL-MCNC: 3 MG/DL — LOW (ref 7–23)
CALCIUM SERPL-MCNC: 9.1 MG/DL — SIGNIFICANT CHANGE UP (ref 8.4–10.5)
CHLORIDE SERPL-SCNC: 108 MMOL/L — HIGH (ref 98–107)
CO2 SERPL-SCNC: 22 MMOL/L — SIGNIFICANT CHANGE UP (ref 22–31)
CREAT SERPL-MCNC: 0.61 MG/DL — SIGNIFICANT CHANGE UP (ref 0.5–1.3)
EGFR: 128 ML/MIN/1.73M2 — SIGNIFICANT CHANGE UP
GLUCOSE SERPL-MCNC: 93 MG/DL — SIGNIFICANT CHANGE UP (ref 70–99)
HCT VFR BLD CALC: 36 % — SIGNIFICANT CHANGE UP (ref 34.5–45)
HGB BLD-MCNC: 11.1 G/DL — LOW (ref 11.5–15.5)
MAGNESIUM SERPL-MCNC: 2 MG/DL — SIGNIFICANT CHANGE UP (ref 1.6–2.6)
MCHC RBC-ENTMCNC: 23.7 PG — LOW (ref 27–34)
MCHC RBC-ENTMCNC: 30.8 GM/DL — LOW (ref 32–36)
MCV RBC AUTO: 76.8 FL — LOW (ref 80–100)
NRBC # BLD: 0 /100 WBCS — SIGNIFICANT CHANGE UP (ref 0–0)
NRBC # FLD: 0 K/UL — SIGNIFICANT CHANGE UP (ref 0–0)
PHOSPHATE SERPL-MCNC: 2 MG/DL — LOW (ref 2.5–4.5)
PLATELET # BLD AUTO: 364 K/UL — SIGNIFICANT CHANGE UP (ref 150–400)
POTASSIUM SERPL-MCNC: 3.8 MMOL/L — SIGNIFICANT CHANGE UP (ref 3.5–5.3)
POTASSIUM SERPL-SCNC: 3.8 MMOL/L — SIGNIFICANT CHANGE UP (ref 3.5–5.3)
PROT SERPL-MCNC: 7.3 G/DL — SIGNIFICANT CHANGE UP (ref 6–8.3)
RBC # BLD: 4.69 M/UL — SIGNIFICANT CHANGE UP (ref 3.8–5.2)
RBC # FLD: 17 % — HIGH (ref 10.3–14.5)
SODIUM SERPL-SCNC: 141 MMOL/L — SIGNIFICANT CHANGE UP (ref 135–145)
WBC # BLD: 12.79 K/UL — HIGH (ref 3.8–10.5)
WBC # FLD AUTO: 12.79 K/UL — HIGH (ref 3.8–10.5)

## 2024-04-10 RX ORDER — SODIUM,POTASSIUM PHOSPHATES 278-250MG
1 POWDER IN PACKET (EA) ORAL ONCE
Refills: 0 | Status: COMPLETED | OUTPATIENT
Start: 2024-04-10 | End: 2024-04-10

## 2024-04-10 RX ORDER — OXYCODONE HYDROCHLORIDE 5 MG/1
1 TABLET ORAL
Qty: 8 | Refills: 0
Start: 2024-04-10 | End: 2024-04-11

## 2024-04-10 RX ORDER — ACETAMINOPHEN 500 MG
2 TABLET ORAL
Qty: 0 | Refills: 0 | DISCHARGE
Start: 2024-04-10

## 2024-04-10 RX ORDER — POTASSIUM CHLORIDE 20 MEQ
20 PACKET (EA) ORAL ONCE
Refills: 0 | Status: COMPLETED | OUTPATIENT
Start: 2024-04-10 | End: 2024-04-10

## 2024-04-10 RX ADMIN — Medication 975 MILLIGRAM(S): at 00:43

## 2024-04-10 RX ADMIN — Medication 975 MILLIGRAM(S): at 06:33

## 2024-04-10 RX ADMIN — OXYCODONE HYDROCHLORIDE 5 MILLIGRAM(S): 5 TABLET ORAL at 10:16

## 2024-04-10 RX ADMIN — Medication 1 PACKET(S): at 10:57

## 2024-04-10 RX ADMIN — Medication 975 MILLIGRAM(S): at 06:03

## 2024-04-10 RX ADMIN — Medication 975 MILLIGRAM(S): at 01:13

## 2024-04-10 RX ADMIN — OXYCODONE HYDROCHLORIDE 5 MILLIGRAM(S): 5 TABLET ORAL at 09:16

## 2024-04-10 RX ADMIN — Medication 975 MILLIGRAM(S): at 12:00

## 2024-04-10 RX ADMIN — Medication 20 MILLIEQUIVALENT(S): at 10:56

## 2024-04-10 NOTE — DISCHARGE NOTE PROVIDER - NSDCFUADDAPPT_GEN_ALL_CORE_FT
Follow up with your surgeon, Dr. Herron. Call for an appointment.    Please follow up with your primary care physician regarding your hospitalization     Do not drive while taking pain medications

## 2024-04-10 NOTE — DISCHARGE NOTE PROVIDER - NSDCCPCAREPLAN_GEN_ALL_CORE_FT
PRINCIPAL DISCHARGE DIAGNOSIS  Diagnosis: Acute pancreatitis  Assessment and Plan of Treatment:       SECONDARY DISCHARGE DIAGNOSES  Diagnosis: Cholelithiases  Assessment and Plan of Treatment:

## 2024-04-10 NOTE — DISCHARGE NOTE PROVIDER - NSDCMRMEDTOKEN_GEN_ALL_CORE_FT
acetaminophen 325 mg oral tablet: 2 tab(s) orally every 6 hours as needed for  mild pain  oxyCODONE 5 mg oral tablet: 1 tab(s) orally every 6 hours as needed for severe pain MDD: 4

## 2024-04-10 NOTE — DISCHARGE NOTE PROVIDER - HOSPITAL COURSE
22 yo F w/ PMHx of  6 weeks presented to the ED with complaints of stable substernal chest pain x4 days now localized to epigastrium associated w/ NBNB emesis. Patient states has similar substernal chest pain during pregnancy however attributed to reflux.   In the ED, AVS stable. Labs show  WBC 11, T bili 3.5, , , , lipase >3000. RUQ US w/ gallstone, CBD 5mm. Surgery consulted for evaluation   Patient's labs improved during hospitalization  Patient admitted to surgical service and taken to the OR s/p lap cholecystectomy.  Post operatively patient hemodynamically stable and transferred to the floor. Pain well controlled, diet advanced and tolerated and patient OOB and ambulating.   Patient stable for discharge home to follow up as an outpatient.

## 2024-04-10 NOTE — DISCHARGE NOTE NURSING/CASE MANAGEMENT/SOCIAL WORK - NSDCPEFALRISK_GEN_ALL_CORE
For information on Fall & Injury Prevention, visit: [email or web address] OR  [email or web address] OR  https://www.cdc.gov/steadi/patient.html

## 2024-04-10 NOTE — DISCHARGE NOTE PROVIDER - NSDCDCMDCOMP_GEN_ALL_CORE
BP goal is < 140/90.   Tolerating ACEi  Blood pressure goals discussed with patient  Blood pressure above goal in clinic today--he is following with Cardiology and saw them this morning  Will defer to Cardiology for management   This document is complete and the patient is ready for discharge.

## 2024-04-10 NOTE — DISCHARGE NOTE NURSING/CASE MANAGEMENT/SOCIAL WORK - PATIENT PORTAL LINK FT
You can access the FollowMyHealth Patient Portal offered by [facility] by registering at the following website: [email or web address]. By joining [employer]’s FollowMyHealth portal, you will also be able to view your health information using other applications (apps) compatible with our system.

## 2024-04-10 NOTE — PROGRESS NOTE ADULT - SUBJECTIVE AND OBJECTIVE BOX
B TEAM SURGERY DAILY PROGRESS NOTE    SUBJECTIVE: Pt seen and examined at bedside. Patient resting comfortably, denies N/V, abdominal pain or any other complaints at this time    VITALS  T(C): 36.8 (04-10-24 @ 06:03), Max: 37.3 (24 @ 09:39)  HR: 63 (04-10-24 @ 06:03) (63 - 111)  BP: 117/63 (04-10-24 @ 06:03) (101/43 - 132/73)  BP(mean): 74 (24 @ 14:00) (69 - 87)  ABP: --  ABP(mean): --  RR: 18 (04-10-24 @ 06:03) (12 - 22)  SpO2: 100% (04-10-24 @ 06:03) (94% - 100%)     07:01  -  04-10 @ 07:00  --------------------------------------------------------  IN:    Oral Fluid: 600 mL  Total IN: 600 mL    OUT:    Voided (mL): 1760 mL  Total OUT: 1760 mL    Total NET: -1160 mL      General Appearance: Appears well, NAD, resting comfortably in bed  Abd: soft, NT, ND, incisions c/d/i        LABS:         CBC ( 05:34)                              10.4<L>                         10.85<H>  )----------------(  362        --    % Neutrophils, --    % Lymphocytes, ANC: --                                  34.9      BMP ( 05:34)             140     |  107     |  8     		Ca++ --      Ca 8.8                ---------------------------------( 57<L> 		Mg 1.90               4.2     |  16<L>   |  0.61  			Ph 3.1       LFTs ( 05:34)      TPro 6.9 / Alb 3.6 / TBili 0.6 / DBili 0.2 / <H> / <H> / AlkPhos 159<H>    Coags ( @ 05:34)  aPTT 36.3<H> / INR 1.12 / PT 12.5    Urinalysis ( @ 05:34):     Color:  / Appearance:  / SG:  / pH:  / Gluc: 57<L> / Ketones:  / Bili:  / Urobili:  / Protein : / Nitrites:  / Leuk.Est:  / RBC:  / WBC:  / Sq Epi:  / Non Sq Epi:  / Bacteria        Assessment and Plan:   · Assessment	  24 yo F w/ PMHx of  6 weeks presented to the ED with complaints of stable substernal chest pain x4 days localized to epigastrium associated w/ NBNB emesis. Patient states has similar substernal chest pain during pregnancy however attributed to reflux. In the ED, AVS stable. Labs show  WBC 11, T bili 3.5, , , , lipase >3000. RUQ US w/ gallstone, CBD 5mm. Surgery consulted for evaluation of gallstone pancreatitis   s/p lap cholecystectomy    - Reg diet  - OOB  - pain control  - d/c planning after am labs are resulted  [id number]

## 2024-04-10 NOTE — DISCHARGE NOTE PROVIDER - CARE PROVIDER_API CALL
Pato Herron  Surgery  [id number] [place] [id number]  Phone: [phone]  Fax: [phone]  Follow Up Time: 2 weeks

## 2024-04-10 NOTE — DISCHARGE NOTE PROVIDER - PROVIDER TOKENS
How Severe Is Your Skin Discoloration?: moderate Additional History: Patient states he doctor instructed her to get this checked.  She states it has been there for at least 2 months but prior to that she always had nail polish on. PROVIDER:[TOKEN:[[id number]],FOLLOWUP:[2 weeks]]

## 2024-04-10 NOTE — DISCHARGE NOTE PROVIDER - CARE PROVIDERS DIRECT ADDRESSES
[email or web address]

## 2024-04-17 LAB — SURGICAL PATHOLOGY STUDY: SIGNIFICANT CHANGE UP

## 2024-04-26 ENCOUNTER — APPOINTMENT (OUTPATIENT)
Dept: TRAUMA SURGERY | Facility: HOSPITAL | Age: 24
End: 2024-04-26

## 2024-06-11 ENCOUNTER — APPOINTMENT (OUTPATIENT)
Dept: TRAUMA SURGERY | Facility: HOSPITAL | Age: 24
End: 2024-06-11

## 2025-03-17 ENCOUNTER — APPOINTMENT (OUTPATIENT)
Dept: PEDIATRIC CARDIOLOGY | Facility: CLINIC | Age: 25
End: 2025-03-17
Payer: COMMERCIAL

## 2025-03-17 ENCOUNTER — NON-APPOINTMENT (OUTPATIENT)
Age: 25
End: 2025-03-17

## 2025-03-17 PROCEDURE — 99203 OFFICE O/P NEW LOW 30 MIN: CPT

## 2025-03-17 PROCEDURE — 76821 MIDDLE CEREBRAL ARTERY ECHO: CPT

## 2025-03-17 PROCEDURE — 76820 UMBILICAL ARTERY ECHO: CPT

## 2025-03-17 PROCEDURE — 76827 ECHO EXAM OF FETAL HEART: CPT

## 2025-03-17 PROCEDURE — 76825 ECHO EXAM OF FETAL HEART: CPT

## 2025-03-17 PROCEDURE — 93325 DOPPLER ECHO COLOR FLOW MAPG: CPT | Mod: 59

## (undated) DEVICE — DRAPE 1/2 SHEET 40X57"

## (undated) DEVICE — ELCTR BOVIE PENCIL SMOKE EVACUATION

## (undated) DEVICE — WARMING BLANKET UPPER ADULT

## (undated) DEVICE — TROCAR COVIDIEN VERSAPORT BLADELESS OPTICAL 5MM STANDARD

## (undated) DEVICE — POSITIONER STRAP ARMBOARD VELCRO TS-30

## (undated) DEVICE — TUBING STRYKEFLOW II SUCTION / IRRIGATOR

## (undated) DEVICE — TIP METZENBAUM SCISSOR MONOPOLAR ENDOCUT (ORANGE)

## (undated) DEVICE — SUT MONOCRYL 4-0 27" PS-2 UNDYED

## (undated) DEVICE — VENODYNE/SCD SLEEVE CALF MEDIUM

## (undated) DEVICE — SYR LUER LOK 20CC

## (undated) DEVICE — PACK GENERAL LAPAROSCOPY

## (undated) DEVICE — DRAPE 3/4 SHEET 52X76"

## (undated) DEVICE — ELCTR BOVIE TIP BLADE INSULATED 2.75" EDGE

## (undated) DEVICE — D HELP - CLEARVIEW CLEARIFY SYSTEM

## (undated) DEVICE — TUBING INSUFFLATION LAP FILTER 10FT

## (undated) DEVICE — DRAPE TOWEL BLUE 17" X 24"

## (undated) DEVICE — ELCTR GROUNDING PAD ADULT COVIDIEN

## (undated) DEVICE — SOL IRR POUR NS 0.9% 1000ML

## (undated) DEVICE — BLADE SURGICAL #15 CARBON

## (undated) DEVICE — DRSG STERISTRIPS 0.5 X 4"

## (undated) DEVICE — TUBING OLYMPUS INSUFFLATION

## (undated) DEVICE — SUT VICRYL 0 27" UR-6

## (undated) DEVICE — BASIN SET SINGLE

## (undated) DEVICE — DISSECTOR ENDOSCOPIC KITTNER SINGLE TIP

## (undated) DEVICE — ENDOCATCH 10MM SPECIMEN POUCH

## (undated) DEVICE — TROCAR COVIDIEN VERSAONE BLADED FIXATION 11MM STANDARD

## (undated) DEVICE — GLV 7.5 PROTEXIS (WHITE)

## (undated) DEVICE — SOL IRR POUR H2O 500ML

## (undated) DEVICE — PROTECTOR HEEL / ELBOW

## (undated) DEVICE — TROCAR COVIDIEN BLUNT TIP HASSAN 10MM